# Patient Record
Sex: MALE | Race: BLACK OR AFRICAN AMERICAN | NOT HISPANIC OR LATINO | Employment: FULL TIME | URBAN - METROPOLITAN AREA
[De-identification: names, ages, dates, MRNs, and addresses within clinical notes are randomized per-mention and may not be internally consistent; named-entity substitution may affect disease eponyms.]

---

## 2024-01-30 ENCOUNTER — APPOINTMENT (OUTPATIENT)
Dept: CARDIOLOGY | Facility: HOSPITAL | Age: 44
End: 2024-01-30
Payer: MEDICAID

## 2024-01-30 ENCOUNTER — HOSPITAL ENCOUNTER (INPATIENT)
Facility: HOSPITAL | Age: 44
LOS: 7 days | Discharge: HOME | End: 2024-02-06
Attending: EMERGENCY MEDICINE | Admitting: INTERNAL MEDICINE
Payer: MEDICAID

## 2024-01-30 ENCOUNTER — CLINICAL SUPPORT (OUTPATIENT)
Dept: EMERGENCY MEDICINE | Facility: HOSPITAL | Age: 44
End: 2024-01-30
Payer: MEDICAID

## 2024-01-30 ENCOUNTER — APPOINTMENT (OUTPATIENT)
Dept: RADIOLOGY | Facility: HOSPITAL | Age: 44
End: 2024-01-30
Payer: MEDICAID

## 2024-01-30 DIAGNOSIS — K92.1 GASTROINTESTINAL HEMORRHAGE WITH MELENA: ICD-10-CM

## 2024-01-30 DIAGNOSIS — I40.9 ACUTE MYOCARDITIS, UNSPECIFIED (HHS-HCC): ICD-10-CM

## 2024-01-30 DIAGNOSIS — K92.2 GASTROINTESTINAL HEMORRHAGE, UNSPECIFIED GASTROINTESTINAL HEMORRHAGE TYPE: ICD-10-CM

## 2024-01-30 DIAGNOSIS — Z13.6 ENCOUNTER FOR SCREENING FOR CARDIOVASCULAR DISORDERS: ICD-10-CM

## 2024-01-30 DIAGNOSIS — R60.0 LOCALIZED EDEMA: ICD-10-CM

## 2024-01-30 DIAGNOSIS — J11.1 FLU: ICD-10-CM

## 2024-01-30 DIAGNOSIS — I50.20 HFREF (HEART FAILURE WITH REDUCED EJECTION FRACTION) (MULTI): ICD-10-CM

## 2024-01-30 DIAGNOSIS — G47.00 ACUTE INSOMNIA: ICD-10-CM

## 2024-01-30 DIAGNOSIS — I40.0 ACUTE VIRAL MYOCARDITIS (HHS-HCC): Primary | ICD-10-CM

## 2024-01-30 DIAGNOSIS — R09.89 HEMODYNAMIC INSTABILITY: ICD-10-CM

## 2024-01-30 LAB
ALBUMIN SERPL BCP-MCNC: 4.2 G/DL (ref 3.4–5)
ALBUMIN SERPL BCP-MCNC: 4.5 G/DL (ref 3.4–5)
ALBUMIN SERPL BCP-MCNC: 4.7 G/DL (ref 3.4–5)
ALP SERPL-CCNC: 122 U/L (ref 33–120)
ALP SERPL-CCNC: 96 U/L (ref 33–120)
ALT SERPL W P-5'-P-CCNC: 19 U/L (ref 10–52)
ALT SERPL W P-5'-P-CCNC: 21 U/L (ref 10–52)
ANION GAP BLDV CALCULATED.4IONS-SCNC: 13 MMOL/L (ref 10–25)
ANION GAP BLDV CALCULATED.4IONS-SCNC: 16 MMOL/L (ref 10–25)
ANION GAP BLDV CALCULATED.4IONS-SCNC: 17 MMOL/L (ref 10–25)
ANION GAP BLDV CALCULATED.4IONS-SCNC: 17 MMOL/L (ref 10–25)
ANION GAP SERPL CALC-SCNC: 19 MMOL/L (ref 10–20)
ANION GAP SERPL CALC-SCNC: 23 MMOL/L (ref 10–20)
ANION GAP SERPL CALC-SCNC: 25 MMOL/L (ref 10–20)
AORTIC VALVE PEAK VELOCITY: 1.68 M/S
APTT PPP: 26 SECONDS (ref 27–38)
APTT PPP: 39 SECONDS (ref 27–38)
AST SERPL W P-5'-P-CCNC: 114 U/L (ref 9–39)
AST SERPL W P-5'-P-CCNC: 95 U/L (ref 9–39)
AV PEAK GRADIENT: 11.3 MMHG
AVA (PEAK VEL): 1.91 CM2
BASE EXCESS BLDV CALC-SCNC: -5.6 MMOL/L (ref -2–3)
BASE EXCESS BLDV CALC-SCNC: -6.9 MMOL/L (ref -2–3)
BASE EXCESS BLDV CALC-SCNC: -7.2 MMOL/L (ref -2–3)
BASE EXCESS BLDV CALC-SCNC: -7.8 MMOL/L (ref -2–3)
BASOPHILS # BLD AUTO: 0.05 X10*3/UL (ref 0–0.1)
BASOPHILS # BLD MANUAL: 0.16 X10*3/UL (ref 0–0.1)
BASOPHILS NFR BLD AUTO: 0.3 %
BASOPHILS NFR BLD MANUAL: 0.9 %
BILIRUB SERPL-MCNC: 0.3 MG/DL (ref 0–1.2)
BILIRUB SERPL-MCNC: 0.5 MG/DL (ref 0–1.2)
BNP SERPL-MCNC: 113 PG/ML (ref 0–99)
BNP SERPL-MCNC: 236 PG/ML (ref 0–99)
BODY TEMPERATURE: 37 DEGREES CELSIUS
BUN SERPL-MCNC: 23 MG/DL (ref 6–23)
BUN SERPL-MCNC: 25 MG/DL (ref 6–23)
BUN SERPL-MCNC: 27 MG/DL (ref 6–23)
CA-I BLDV-SCNC: 1.03 MMOL/L (ref 1.1–1.33)
CA-I BLDV-SCNC: 1.04 MMOL/L (ref 1.1–1.33)
CA-I BLDV-SCNC: 1.06 MMOL/L (ref 1.1–1.33)
CA-I BLDV-SCNC: 1.09 MMOL/L (ref 1.1–1.33)
CALCIUM SERPL-MCNC: 10.2 MG/DL (ref 8.6–10.6)
CALCIUM SERPL-MCNC: 8.7 MG/DL (ref 8.6–10.6)
CALCIUM SERPL-MCNC: 9.4 MG/DL (ref 8.6–10.6)
CARDIAC TROPONIN I PNL SERPL HS: 166 NG/L (ref 0–53)
CARDIAC TROPONIN I PNL SERPL HS: 173 NG/L (ref 0–53)
CARDIAC TROPONIN I PNL SERPL HS: 174 NG/L (ref 0–53)
CARDIAC TROPONIN I PNL SERPL HS: 177 NG/L (ref 0–53)
CHLORIDE BLDV-SCNC: 86 MMOL/L (ref 98–107)
CHLORIDE BLDV-SCNC: 88 MMOL/L (ref 98–107)
CHLORIDE BLDV-SCNC: 89 MMOL/L (ref 98–107)
CHLORIDE BLDV-SCNC: 92 MMOL/L (ref 98–107)
CHLORIDE SERPL-SCNC: 85 MMOL/L (ref 98–107)
CHLORIDE SERPL-SCNC: 87 MMOL/L (ref 98–107)
CHLORIDE SERPL-SCNC: 90 MMOL/L (ref 98–107)
CHOLEST SERPL-MCNC: 125 MG/DL (ref 0–199)
CHOLESTEROL/HDL RATIO: 2.5
CO2 SERPL-SCNC: 15 MMOL/L (ref 21–32)
CO2 SERPL-SCNC: 16 MMOL/L (ref 21–32)
CO2 SERPL-SCNC: 18 MMOL/L (ref 21–32)
CREAT SERPL-MCNC: 2.11 MG/DL (ref 0.5–1.3)
CREAT SERPL-MCNC: 2.19 MG/DL (ref 0.5–1.3)
CREAT SERPL-MCNC: 3.09 MG/DL (ref 0.5–1.3)
EGFRCR SERPLBLD CKD-EPI 2021: 25 ML/MIN/1.73M*2
EGFRCR SERPLBLD CKD-EPI 2021: 37 ML/MIN/1.73M*2
EGFRCR SERPLBLD CKD-EPI 2021: 39 ML/MIN/1.73M*2
EJECTION FRACTION APICAL 4 CHAMBER: 55
EJECTION FRACTION: 43 %
EOSINOPHIL # BLD AUTO: 0 X10*3/UL (ref 0–0.7)
EOSINOPHIL # BLD MANUAL: 0 X10*3/UL (ref 0–0.7)
EOSINOPHIL NFR BLD AUTO: 0 %
EOSINOPHIL NFR BLD MANUAL: 0 %
ERYTHROCYTE [DISTWIDTH] IN BLOOD BY AUTOMATED COUNT: 13.5 % (ref 11.5–14.5)
ERYTHROCYTE [DISTWIDTH] IN BLOOD BY AUTOMATED COUNT: 13.8 % (ref 11.5–14.5)
FLUAV RNA RESP QL NAA+PROBE: DETECTED
FLUBV RNA RESP QL NAA+PROBE: NOT DETECTED
GLUCOSE BLD MANUAL STRIP-MCNC: 114 MG/DL (ref 74–99)
GLUCOSE BLDV-MCNC: 113 MG/DL (ref 74–99)
GLUCOSE BLDV-MCNC: 115 MG/DL (ref 74–99)
GLUCOSE BLDV-MCNC: 117 MG/DL (ref 74–99)
GLUCOSE BLDV-MCNC: 158 MG/DL (ref 74–99)
GLUCOSE SERPL-MCNC: 139 MG/DL (ref 74–99)
GLUCOSE SERPL-MCNC: 93 MG/DL (ref 74–99)
GLUCOSE SERPL-MCNC: 98 MG/DL (ref 74–99)
HCO3 BLDV-SCNC: 15.4 MMOL/L (ref 22–26)
HCO3 BLDV-SCNC: 19.2 MMOL/L (ref 22–26)
HCO3 BLDV-SCNC: 19.4 MMOL/L (ref 22–26)
HCO3 BLDV-SCNC: 20.2 MMOL/L (ref 22–26)
HCT VFR BLD AUTO: 43.8 % (ref 41–52)
HCT VFR BLD AUTO: 48.7 % (ref 41–52)
HCT VFR BLD EST: 47 % (ref 41–52)
HCT VFR BLD EST: 50 % (ref 41–52)
HCT VFR BLD EST: 52 % (ref 41–52)
HCT VFR BLD EST: 55 % (ref 41–52)
HDLC SERPL-MCNC: 50.8 MG/DL
HGB BLD-MCNC: 16.2 G/DL (ref 13.5–17.5)
HGB BLD-MCNC: 18 G/DL (ref 13.5–17.5)
HGB BLDV-MCNC: 15.7 G/DL (ref 13.5–17.5)
HGB BLDV-MCNC: 16.8 G/DL (ref 13.5–17.5)
HGB BLDV-MCNC: 17.2 G/DL (ref 13.5–17.5)
HGB BLDV-MCNC: 18.2 G/DL (ref 13.5–17.5)
IMM GRANULOCYTES # BLD AUTO: 0.1 X10*3/UL (ref 0–0.7)
IMM GRANULOCYTES # BLD AUTO: 0.1 X10*3/UL (ref 0–0.7)
IMM GRANULOCYTES NFR BLD AUTO: 0.6 % (ref 0–0.9)
IMM GRANULOCYTES NFR BLD AUTO: 0.6 % (ref 0–0.9)
INHALED O2 CONCENTRATION: 32 %
INHALED O2 CONCENTRATION: 40 %
INR PPP: 1.1 (ref 0.9–1.1)
INR PPP: 1.2 (ref 0.9–1.1)
LACTATE BLDV-SCNC: 1.8 MMOL/L (ref 0.4–2)
LACTATE BLDV-SCNC: 1.9 MMOL/L (ref 0.4–2)
LACTATE BLDV-SCNC: 3.6 MMOL/L (ref 0.4–2)
LACTATE BLDV-SCNC: 5.3 MMOL/L (ref 0.4–2)
LDLC SERPL CALC-MCNC: 54 MG/DL
LEFT ATRIUM VOLUME AREA LENGTH INDEX BSA: 18.4 ML/M2
LEFT VENTRICLE INTERNAL DIMENSION DIASTOLE: 5.47 CM (ref 3.5–6)
LEFT VENTRICULAR OUTFLOW TRACT DIAMETER: 2 CM
LYMPHOCYTES # BLD AUTO: 1.34 X10*3/UL (ref 1.2–4.8)
LYMPHOCYTES # BLD MANUAL: 1.58 X10*3/UL (ref 1.2–4.8)
LYMPHOCYTES NFR BLD AUTO: 7.8 %
LYMPHOCYTES NFR BLD MANUAL: 8.8 %
MAGNESIUM SERPL-MCNC: 1.52 MG/DL (ref 1.6–2.4)
MCH RBC QN AUTO: 30.3 PG (ref 26–34)
MCH RBC QN AUTO: 30.7 PG (ref 26–34)
MCHC RBC AUTO-ENTMCNC: 37 G/DL (ref 32–36)
MCHC RBC AUTO-ENTMCNC: 37 G/DL (ref 32–36)
MCV RBC AUTO: 82 FL (ref 80–100)
MCV RBC AUTO: 83 FL (ref 80–100)
METAMYELOCYTES # BLD MANUAL: 0.16 X10*3/UL
METAMYELOCYTES NFR BLD MANUAL: 0.9 %
MONOCYTES # BLD AUTO: 0.37 X10*3/UL (ref 0.1–1)
MONOCYTES # BLD MANUAL: 0.16 X10*3/UL (ref 0.1–1)
MONOCYTES NFR BLD AUTO: 2.2 %
MONOCYTES NFR BLD MANUAL: 0.9 %
NEUTROPHILS # BLD AUTO: 15.31 X10*3/UL (ref 1.2–7.7)
NEUTROPHILS # BLD MANUAL: 14.75 X10*3/UL (ref 1.2–7.7)
NEUTROPHILS NFR BLD AUTO: 89.1 %
NEUTS BAND # BLD MANUAL: 6.28 X10*3/UL (ref 0–0.7)
NEUTS BAND NFR BLD MANUAL: 35.1 %
NEUTS SEG # BLD MANUAL: 8.47 X10*3/UL (ref 1.2–7)
NEUTS SEG NFR BLD MANUAL: 47.3 %
NON HDL CHOLESTEROL: 74 MG/DL (ref 0–149)
NRBC BLD-RTO: 0.3 /100 WBCS (ref 0–0)
NRBC BLD-RTO: 0.4 /100 WBCS (ref 0–0)
OXYHGB MFR BLDV: 22 % (ref 45–75)
OXYHGB MFR BLDV: 24.5 % (ref 45–75)
OXYHGB MFR BLDV: 8.3 % (ref 45–75)
OXYHGB MFR BLDV: 82.6 % (ref 45–75)
PCO2 BLDV: 26 MM HG (ref 41–51)
PCO2 BLDV: 36 MM HG (ref 41–51)
PCO2 BLDV: 40 MM HG (ref 41–51)
PCO2 BLDV: 46 MM HG (ref 41–51)
PH BLDV: 7.25 PH (ref 7.33–7.43)
PH BLDV: 7.29 PH (ref 7.33–7.43)
PH BLDV: 7.34 PH (ref 7.33–7.43)
PH BLDV: 7.38 PH (ref 7.33–7.43)
PHOSPHATE SERPL-MCNC: 5.5 MG/DL (ref 2.5–4.9)
PHOSPHATE SERPL-MCNC: 6.1 MG/DL (ref 2.5–4.9)
PLATELET # BLD AUTO: 180 X10*3/UL (ref 150–450)
PLATELET # BLD AUTO: 195 X10*3/UL (ref 150–450)
PO2 BLDV: 17 MM HG (ref 35–45)
PO2 BLDV: 23 MM HG (ref 35–45)
PO2 BLDV: 28 MM HG (ref 35–45)
PO2 BLDV: 59 MM HG (ref 35–45)
POTASSIUM BLDV-SCNC: 2.9 MMOL/L (ref 3.5–5.3)
POTASSIUM BLDV-SCNC: 2.9 MMOL/L (ref 3.5–5.3)
POTASSIUM BLDV-SCNC: 3 MMOL/L (ref 3.5–5.3)
POTASSIUM BLDV-SCNC: 3.3 MMOL/L (ref 3.5–5.3)
POTASSIUM SERPL-SCNC: 2.9 MMOL/L (ref 3.5–5.3)
POTASSIUM SERPL-SCNC: 3.1 MMOL/L (ref 3.5–5.3)
POTASSIUM SERPL-SCNC: 3.4 MMOL/L (ref 3.5–5.3)
PROT SERPL-MCNC: 11.1 G/DL (ref 6.4–8.2)
PROT SERPL-MCNC: 9.1 G/DL (ref 6.4–8.2)
PROTHROMBIN TIME: 12.3 SECONDS (ref 9.8–12.8)
PROTHROMBIN TIME: 13.5 SECONDS (ref 9.8–12.8)
RBC # BLD AUTO: 5.35 X10*6/UL (ref 4.5–5.9)
RBC # BLD AUTO: 5.87 X10*6/UL (ref 4.5–5.9)
RBC MORPH BLD: ABNORMAL
RIGHT VENTRICLE FREE WALL PEAK S': 7.72 CM/S
SAO2 % BLDV: 22 % (ref 45–75)
SAO2 % BLDV: 25 % (ref 45–75)
SAO2 % BLDV: 8 % (ref 45–75)
SAO2 % BLDV: 84 % (ref 45–75)
SARS-COV-2 RNA RESP QL NAA+PROBE: NOT DETECTED
SODIUM BLDV-SCNC: 118 MMOL/L (ref 136–145)
SODIUM BLDV-SCNC: 118 MMOL/L (ref 136–145)
SODIUM BLDV-SCNC: 121 MMOL/L (ref 136–145)
SODIUM BLDV-SCNC: 122 MMOL/L (ref 136–145)
SODIUM SERPL-SCNC: 122 MMOL/L (ref 136–145)
SODIUM SERPL-SCNC: 123 MMOL/L (ref 136–145)
SODIUM SERPL-SCNC: 124 MMOL/L (ref 136–145)
TOTAL CELLS COUNTED BLD: 114
TRICUSPID ANNULAR PLANE SYSTOLIC EXCURSION: 0.8 CM
TRIGL SERPL-MCNC: 102 MG/DL (ref 0–149)
VARIANT LYMPHS # BLD MANUAL: 1.09 X10*3/UL (ref 0–0.5)
VARIANT LYMPHS NFR BLD: 6.1 %
VLDL: 20 MG/DL (ref 0–40)
WBC # BLD AUTO: 17.2 X10*3/UL (ref 4.4–11.3)
WBC # BLD AUTO: 17.9 X10*3/UL (ref 4.4–11.3)

## 2024-01-30 PROCEDURE — 83880 ASSAY OF NATRIURETIC PEPTIDE: CPT

## 2024-01-30 PROCEDURE — 80053 COMPREHEN METABOLIC PANEL: CPT

## 2024-01-30 PROCEDURE — 93308 TTE F-UP OR LMTD: CPT

## 2024-01-30 PROCEDURE — 96367 TX/PROPH/DG ADDL SEQ IV INF: CPT

## 2024-01-30 PROCEDURE — 2500000004 HC RX 250 GENERAL PHARMACY W/ HCPCS (ALT 636 FOR OP/ED): Performed by: EMERGENCY MEDICINE

## 2024-01-30 PROCEDURE — 71275 CT ANGIOGRAPHY CHEST: CPT | Mod: FOREIGN READ | Performed by: RADIOLOGY

## 2024-01-30 PROCEDURE — 85060 BLOOD SMEAR INTERPRETATION: CPT

## 2024-01-30 PROCEDURE — 84443 ASSAY THYROID STIM HORMONE: CPT | Performed by: STUDENT IN AN ORGANIZED HEALTH CARE EDUCATION/TRAINING PROGRAM

## 2024-01-30 PROCEDURE — 94640 AIRWAY INHALATION TREATMENT: CPT

## 2024-01-30 PROCEDURE — 87636 SARSCOV2 & INF A&B AMP PRB: CPT

## 2024-01-30 PROCEDURE — 84484 ASSAY OF TROPONIN QUANT: CPT

## 2024-01-30 PROCEDURE — 99291 CRITICAL CARE FIRST HOUR: CPT | Performed by: INTERNAL MEDICINE

## 2024-01-30 PROCEDURE — 85007 BL SMEAR W/DIFF WBC COUNT: CPT

## 2024-01-30 PROCEDURE — 99291 CRITICAL CARE FIRST HOUR: CPT | Mod: 25 | Performed by: EMERGENCY MEDICINE

## 2024-01-30 PROCEDURE — 2500000004 HC RX 250 GENERAL PHARMACY W/ HCPCS (ALT 636 FOR OP/ED): Mod: SE | Performed by: STUDENT IN AN ORGANIZED HEALTH CARE EDUCATION/TRAINING PROGRAM

## 2024-01-30 PROCEDURE — 2500000004 HC RX 250 GENERAL PHARMACY W/ HCPCS (ALT 636 FOR OP/ED)

## 2024-01-30 PROCEDURE — 80061 LIPID PANEL: CPT | Performed by: STUDENT IN AN ORGANIZED HEALTH CARE EDUCATION/TRAINING PROGRAM

## 2024-01-30 PROCEDURE — 82947 ASSAY GLUCOSE BLOOD QUANT: CPT

## 2024-01-30 PROCEDURE — 71275 CT ANGIOGRAPHY CHEST: CPT | Mod: FR

## 2024-01-30 PROCEDURE — 93306 TTE W/DOPPLER COMPLETE: CPT

## 2024-01-30 PROCEDURE — 99285 EMERGENCY DEPT VISIT HI MDM: CPT | Performed by: EMERGENCY MEDICINE

## 2024-01-30 PROCEDURE — 84132 ASSAY OF SERUM POTASSIUM: CPT | Performed by: STUDENT IN AN ORGANIZED HEALTH CARE EDUCATION/TRAINING PROGRAM

## 2024-01-30 PROCEDURE — 36415 COLL VENOUS BLD VENIPUNCTURE: CPT | Performed by: STUDENT IN AN ORGANIZED HEALTH CARE EDUCATION/TRAINING PROGRAM

## 2024-01-30 PROCEDURE — 84132 ASSAY OF SERUM POTASSIUM: CPT

## 2024-01-30 PROCEDURE — 87040 BLOOD CULTURE FOR BACTERIA: CPT

## 2024-01-30 PROCEDURE — 93308 TTE F-UP OR LMTD: CPT | Performed by: EMERGENCY MEDICINE

## 2024-01-30 PROCEDURE — 85025 COMPLETE CBC W/AUTO DIFF WBC: CPT | Performed by: STUDENT IN AN ORGANIZED HEALTH CARE EDUCATION/TRAINING PROGRAM

## 2024-01-30 PROCEDURE — 93010 ELECTROCARDIOGRAM REPORT: CPT | Performed by: STUDENT IN AN ORGANIZED HEALTH CARE EDUCATION/TRAINING PROGRAM

## 2024-01-30 PROCEDURE — 2500000002 HC RX 250 W HCPCS SELF ADMINISTERED DRUGS (ALT 637 FOR MEDICARE OP, ALT 636 FOR OP/ED): Mod: SE

## 2024-01-30 PROCEDURE — 96365 THER/PROPH/DIAG IV INF INIT: CPT | Mod: 59

## 2024-01-30 PROCEDURE — 93005 ELECTROCARDIOGRAM TRACING: CPT

## 2024-01-30 PROCEDURE — 83880 ASSAY OF NATRIURETIC PEPTIDE: CPT | Performed by: STUDENT IN AN ORGANIZED HEALTH CARE EDUCATION/TRAINING PROGRAM

## 2024-01-30 PROCEDURE — 71045 X-RAY EXAM CHEST 1 VIEW: CPT | Performed by: RADIOLOGY

## 2024-01-30 PROCEDURE — 36415 COLL VENOUS BLD VENIPUNCTURE: CPT

## 2024-01-30 PROCEDURE — 2020000001 HC ICU ROOM DAILY

## 2024-01-30 PROCEDURE — 2500000004 HC RX 250 GENERAL PHARMACY W/ HCPCS (ALT 636 FOR OP/ED): Mod: SE

## 2024-01-30 PROCEDURE — 93010 ELECTROCARDIOGRAM REPORT: CPT | Performed by: EMERGENCY MEDICINE

## 2024-01-30 PROCEDURE — 93306 TTE W/DOPPLER COMPLETE: CPT | Performed by: INTERNAL MEDICINE

## 2024-01-30 PROCEDURE — 2500000004 HC RX 250 GENERAL PHARMACY W/ HCPCS (ALT 636 FOR OP/ED): Performed by: STUDENT IN AN ORGANIZED HEALTH CARE EDUCATION/TRAINING PROGRAM

## 2024-01-30 PROCEDURE — 84484 ASSAY OF TROPONIN QUANT: CPT | Performed by: STUDENT IN AN ORGANIZED HEALTH CARE EDUCATION/TRAINING PROGRAM

## 2024-01-30 PROCEDURE — 85610 PROTHROMBIN TIME: CPT | Performed by: STUDENT IN AN ORGANIZED HEALTH CARE EDUCATION/TRAINING PROGRAM

## 2024-01-30 PROCEDURE — 85027 COMPLETE CBC AUTOMATED: CPT

## 2024-01-30 PROCEDURE — 2550000001 HC RX 255 CONTRASTS: Mod: SE | Performed by: EMERGENCY MEDICINE

## 2024-01-30 PROCEDURE — 96361 HYDRATE IV INFUSION ADD-ON: CPT

## 2024-01-30 PROCEDURE — 71045 X-RAY EXAM CHEST 1 VIEW: CPT

## 2024-01-30 PROCEDURE — 83735 ASSAY OF MAGNESIUM: CPT | Performed by: STUDENT IN AN ORGANIZED HEALTH CARE EDUCATION/TRAINING PROGRAM

## 2024-01-30 PROCEDURE — 85610 PROTHROMBIN TIME: CPT

## 2024-01-30 PROCEDURE — 99291 CRITICAL CARE FIRST HOUR: CPT | Performed by: EMERGENCY MEDICINE

## 2024-01-30 PROCEDURE — 84145 PROCALCITONIN (PCT): CPT | Performed by: STUDENT IN AN ORGANIZED HEALTH CARE EDUCATION/TRAINING PROGRAM

## 2024-01-30 PROCEDURE — 2500000001 HC RX 250 WO HCPCS SELF ADMINISTERED DRUGS (ALT 637 FOR MEDICARE OP): Performed by: STUDENT IN AN ORGANIZED HEALTH CARE EDUCATION/TRAINING PROGRAM

## 2024-01-30 RX ORDER — POTASSIUM CHLORIDE 14.9 MG/ML
20 INJECTION INTRAVENOUS ONCE
Status: COMPLETED | OUTPATIENT
Start: 2024-01-30 | End: 2024-01-31

## 2024-01-30 RX ORDER — CEFTRIAXONE 1 G/50ML
1 INJECTION, SOLUTION INTRAVENOUS EVERY 24 HOURS
Status: DISCONTINUED | OUTPATIENT
Start: 2024-01-31 | End: 2024-02-02

## 2024-01-30 RX ORDER — HYDROMORPHONE HYDROCHLORIDE 1 MG/ML
0.2 INJECTION, SOLUTION INTRAMUSCULAR; INTRAVENOUS; SUBCUTANEOUS
Status: DISCONTINUED | OUTPATIENT
Start: 2024-01-30 | End: 2024-02-06 | Stop reason: HOSPADM

## 2024-01-30 RX ORDER — OSELTAMIVIR PHOSPHATE 75 MG/1
75 CAPSULE ORAL 2 TIMES DAILY
Status: CANCELLED | OUTPATIENT
Start: 2024-01-30 | End: 2024-02-04

## 2024-01-30 RX ORDER — CEFTRIAXONE 1 G/50ML
1 INJECTION, SOLUTION INTRAVENOUS ONCE
Status: COMPLETED | OUTPATIENT
Start: 2024-01-30 | End: 2024-01-30

## 2024-01-30 RX ORDER — ALBUTEROL SULFATE 90 UG/1
2 AEROSOL, METERED RESPIRATORY (INHALATION) EVERY 2 HOUR PRN
Status: CANCELLED | OUTPATIENT
Start: 2024-01-30

## 2024-01-30 RX ORDER — POTASSIUM CHLORIDE 1.5 G/1.58G
20 POWDER, FOR SOLUTION ORAL ONCE
Status: DISCONTINUED | OUTPATIENT
Start: 2024-01-30 | End: 2024-01-30

## 2024-01-30 RX ORDER — HEPARIN SODIUM 5000 [USP'U]/ML
5000 INJECTION, SOLUTION INTRAVENOUS; SUBCUTANEOUS EVERY 8 HOURS
Status: DISCONTINUED | OUTPATIENT
Start: 2024-01-30 | End: 2024-02-03

## 2024-01-30 RX ORDER — ALBUTEROL SULFATE 90 UG/1
2 AEROSOL, METERED RESPIRATORY (INHALATION) EVERY 6 HOURS PRN
Status: DISCONTINUED | OUTPATIENT
Start: 2024-01-30 | End: 2024-02-06 | Stop reason: HOSPADM

## 2024-01-30 RX ORDER — ACETAMINOPHEN 500 MG
5 TABLET ORAL NIGHTLY
Status: DISCONTINUED | OUTPATIENT
Start: 2024-01-30 | End: 2024-02-06 | Stop reason: HOSPADM

## 2024-01-30 RX ORDER — CEFTRIAXONE 1 G/50ML
1 INJECTION, SOLUTION INTRAVENOUS EVERY 24 HOURS
Status: CANCELLED | OUTPATIENT
Start: 2024-01-30 | End: 2024-02-04

## 2024-01-30 RX ORDER — OSELTAMIVIR PHOSPHATE 75 MG/1
75 CAPSULE ORAL 2 TIMES DAILY
Status: DISCONTINUED | OUTPATIENT
Start: 2024-01-30 | End: 2024-02-05

## 2024-01-30 RX ORDER — ACETAMINOPHEN 325 MG/1
650 TABLET ORAL EVERY 4 HOURS PRN
Status: DISCONTINUED | OUTPATIENT
Start: 2024-01-30 | End: 2024-02-06 | Stop reason: HOSPADM

## 2024-01-30 RX ORDER — IPRATROPIUM BROMIDE AND ALBUTEROL SULFATE 2.5; .5 MG/3ML; MG/3ML
3 SOLUTION RESPIRATORY (INHALATION)
Status: DISCONTINUED | OUTPATIENT
Start: 2024-01-30 | End: 2024-01-31

## 2024-01-30 RX ORDER — ACETAMINOPHEN 325 MG/1
975 TABLET ORAL ONCE
Status: COMPLETED | OUTPATIENT
Start: 2024-01-30 | End: 2024-01-30

## 2024-01-30 RX ORDER — POTASSIUM CHLORIDE 20 MEQ/1
20 TABLET, EXTENDED RELEASE ORAL ONCE
Status: COMPLETED | OUTPATIENT
Start: 2024-01-30 | End: 2024-01-30

## 2024-01-30 RX ORDER — POTASSIUM CHLORIDE 14.9 MG/ML
20 INJECTION INTRAVENOUS
Status: CANCELLED | OUTPATIENT
Start: 2024-01-30 | End: 2024-01-30

## 2024-01-30 RX ADMIN — IPRATROPIUM BROMIDE AND ALBUTEROL SULFATE 3 ML: .5; 3 SOLUTION RESPIRATORY (INHALATION) at 10:14

## 2024-01-30 RX ADMIN — IPRATROPIUM BROMIDE AND ALBUTEROL SULFATE 3 ML: .5; 3 SOLUTION RESPIRATORY (INHALATION) at 18:50

## 2024-01-30 RX ADMIN — SODIUM CHLORIDE 1000 ML: 9 INJECTION, SOLUTION INTRAVENOUS at 10:13

## 2024-01-30 RX ADMIN — SODIUM CHLORIDE 500 ML: 9 INJECTION, SOLUTION INTRAVENOUS at 13:15

## 2024-01-30 RX ADMIN — CEFTRIAXONE 1 G: 1 INJECTION, SOLUTION INTRAVENOUS at 13:15

## 2024-01-30 RX ADMIN — HEPARIN SODIUM 5000 UNITS: 5000 INJECTION INTRAVENOUS; SUBCUTANEOUS at 22:01

## 2024-01-30 RX ADMIN — POTASSIUM CHLORIDE 20 MEQ: 1500 TABLET, EXTENDED RELEASE ORAL at 23:12

## 2024-01-30 RX ADMIN — AZITHROMYCIN 500 MG: 500 INJECTION, POWDER, LYOPHILIZED, FOR SOLUTION INTRAVENOUS at 14:30

## 2024-01-30 RX ADMIN — SODIUM CHLORIDE 500 ML: 9 INJECTION, SOLUTION INTRAVENOUS at 20:26

## 2024-01-30 RX ADMIN — SODIUM CHLORIDE, SODIUM LACTATE, POTASSIUM CHLORIDE, AND CALCIUM CHLORIDE 500 ML: 600; 310; 30; 20 INJECTION, SOLUTION INTRAVENOUS at 15:33

## 2024-01-30 RX ADMIN — IOHEXOL 90 ML: 350 INJECTION, SOLUTION INTRAVENOUS at 11:38

## 2024-01-30 RX ADMIN — POTASSIUM CHLORIDE 20 MEQ: 14.9 INJECTION, SOLUTION INTRAVENOUS at 22:57

## 2024-01-30 RX ADMIN — IPRATROPIUM BROMIDE AND ALBUTEROL SULFATE 3 ML: .5; 3 SOLUTION RESPIRATORY (INHALATION) at 13:00

## 2024-01-30 RX ADMIN — OSELTAMIVIR PHOSPHATE 75 MG: 75 CAPSULE ORAL at 21:15

## 2024-01-30 RX ADMIN — SODIUM CHLORIDE, SODIUM LACTATE, POTASSIUM CHLORIDE, AND CALCIUM CHLORIDE 500 ML: 600; 310; 30; 20 INJECTION, SOLUTION INTRAVENOUS at 21:30

## 2024-01-30 RX ADMIN — ACETAMINOPHEN 975 MG: 325 TABLET ORAL at 09:55

## 2024-01-30 RX ADMIN — PERFLUTREN 2 ML OF DILUTION: 6.52 INJECTION, SUSPENSION INTRAVENOUS at 16:35

## 2024-01-30 RX ADMIN — Medication 5 MG: at 23:12

## 2024-01-30 SDOH — SOCIAL STABILITY: SOCIAL INSECURITY: ARE YOU OR HAVE YOU BEEN THREATENED OR ABUSED PHYSICALLY, EMOTIONALLY, OR SEXUALLY BY ANYONE?: NO

## 2024-01-30 SDOH — SOCIAL STABILITY: SOCIAL INSECURITY: HAS ANYONE EVER THREATENED TO HURT YOUR FAMILY OR YOUR PETS?: NO

## 2024-01-30 SDOH — SOCIAL STABILITY: SOCIAL INSECURITY: DO YOU FEEL ANYONE HAS EXPLOITED OR TAKEN ADVANTAGE OF YOU FINANCIALLY OR OF YOUR PERSONAL PROPERTY?: NO

## 2024-01-30 SDOH — SOCIAL STABILITY: SOCIAL INSECURITY: DO YOU FEEL UNSAFE GOING BACK TO THE PLACE WHERE YOU ARE LIVING?: NO

## 2024-01-30 SDOH — SOCIAL STABILITY: SOCIAL INSECURITY: HAVE YOU HAD THOUGHTS OF HARMING ANYONE ELSE?: NO

## 2024-01-30 SDOH — SOCIAL STABILITY: SOCIAL INSECURITY: ARE THERE ANY APPARENT SIGNS OF INJURIES/BEHAVIORS THAT COULD BE RELATED TO ABUSE/NEGLECT?: NO

## 2024-01-30 SDOH — SOCIAL STABILITY: SOCIAL INSECURITY: ABUSE: ADULT

## 2024-01-30 SDOH — SOCIAL STABILITY: SOCIAL INSECURITY: DOES ANYONE TRY TO KEEP YOU FROM HAVING/CONTACTING OTHER FRIENDS OR DOING THINGS OUTSIDE YOUR HOME?: NO

## 2024-01-30 ASSESSMENT — COLUMBIA-SUICIDE SEVERITY RATING SCALE - C-SSRS
1. IN THE PAST MONTH, HAVE YOU WISHED YOU WERE DEAD OR WISHED YOU COULD GO TO SLEEP AND NOT WAKE UP?: NO
2. HAVE YOU ACTUALLY HAD ANY THOUGHTS OF KILLING YOURSELF?: NO
6. HAVE YOU EVER DONE ANYTHING, STARTED TO DO ANYTHING, OR PREPARED TO DO ANYTHING TO END YOUR LIFE?: NO

## 2024-01-30 ASSESSMENT — ENCOUNTER SYMPTOMS: TACHYCARDIA: 1

## 2024-01-30 ASSESSMENT — ACTIVITIES OF DAILY LIVING (ADL)
DRESSING YOURSELF: INDEPENDENT
BATHING: INDEPENDENT
ADEQUATE_TO_COMPLETE_ADL: YES
JUDGMENT_ADEQUATE_SAFELY_COMPLETE_DAILY_ACTIVITIES: YES
HEARING - RIGHT EAR: FUNCTIONAL
FEEDING YOURSELF: INDEPENDENT
GROOMING: INDEPENDENT
TOILETING: INDEPENDENT
WALKS IN HOME: INDEPENDENT
HEARING - LEFT EAR: FUNCTIONAL
PATIENT'S MEMORY ADEQUATE TO SAFELY COMPLETE DAILY ACTIVITIES?: YES

## 2024-01-30 ASSESSMENT — PAIN SCALES - GENERAL
PAINLEVEL_OUTOF10: 0 - NO PAIN
PAINLEVEL_OUTOF10: 5 - MODERATE PAIN

## 2024-01-30 ASSESSMENT — PAIN - FUNCTIONAL ASSESSMENT
PAIN_FUNCTIONAL_ASSESSMENT: 0-10
PAIN_FUNCTIONAL_ASSESSMENT: 0-10

## 2024-01-30 ASSESSMENT — LIFESTYLE VARIABLES
HOW MANY STANDARD DRINKS CONTAINING ALCOHOL DO YOU HAVE ON A TYPICAL DAY: PATIENT DOES NOT DRINK
AUDIT-C TOTAL SCORE: 0
HOW OFTEN DO YOU HAVE A DRINK CONTAINING ALCOHOL: NEVER
SUBSTANCE_ABUSE_PAST_12_MONTHS: NO
AUDIT-C TOTAL SCORE: 0
HOW OFTEN DO YOU HAVE 6 OR MORE DRINKS ON ONE OCCASION: NEVER
PRESCIPTION_ABUSE_PAST_12_MONTHS: NO
SKIP TO QUESTIONS 9-10: 1

## 2024-01-30 ASSESSMENT — PATIENT HEALTH QUESTIONNAIRE - PHQ9
1. LITTLE INTEREST OR PLEASURE IN DOING THINGS: NOT AT ALL
SUM OF ALL RESPONSES TO PHQ9 QUESTIONS 1 & 2: 0
2. FEELING DOWN, DEPRESSED OR HOPELESS: NOT AT ALL

## 2024-01-30 NOTE — ED PROVIDER NOTES
CC: Flu Symptoms     HPI:  This patient is a 43-year-old male with past medical history of asthma who presents to the emergency department flulike symptoms.  He states his symptoms have been going on for 2 or 3 days now.  His symptoms include fatigue, shortness of breath, chest pain, diarrhea, nausea, and vomiting.  States his symptoms have been getting worse.  He stayed home to try to fight it off for a few days however is unable to take his symptoms anymore.  He is unable to keep anything down and feels short of breath.  Describes left-sided chest pain that is nonradiating.  Denies any abdominal pain.  He does not have an inhaler to use and therefore has not been taking anything for his symptoms at home.  He denies any sick contacts that he is aware of.    Limitations to history: None  Independent historian(s): Patient  Records Reviewed: Recent available ED and inpatient notes reviewed in EMR.    PMHx/PSHx:  Per HPI.   - has no past medical history on file.  - has no past surgical history on file.    Medications:  Reviewed in EMR. See EMR for complete list of medications and doses.    Allergies:  Patient has no known allergies.    Social History:  - Tobacco:  has no history on file for tobacco use.   - Alcohol:  has no history on file for alcohol use.   - Illicit Drugs:  has no history on file for drug use.     ROS:  Per HPI.       ???????????????????????????????????????????????????????????????  Triage Vitals:  T 36.8 °C (98.2 °F)  HR (!) 112  BP 87/58  RR 17  O2 98 %      Physical Exam    General: Male patient lying in bed, visibly rigoring, no respiratory distress, appears acutely sick and toxic, talking in full sentences without any confusion or mental status changes.  Head: Normocephalic.  Small cephalhematoma with dried blood and mild tenderness to palpation to the anterior left forehead.  No active bleeding.  Neck: No meningismus.  FROM. No gross masses.   Eyes: EOMI. No scleral icterus or  injection.  ENT: Dry mucous membranes, no apparent trauma or lesions.  CV: Regular rhythm. No murmurs, rubs, gallops appreciated. 2+ radial pulses bilaterally.  Resp: Clear to auscultation bilaterally. No significant respiratory distress.   GI: Soft, non-distended.  No tenderness with palpation.    EXT: No peripheral edema, contusions, or wounds.  Skin: Warm, mildly diaphoretic, no rashes or lesions.  Neuro: Alert and oriented.  No focal neurological deficits.  Motor and sensation intact throughout.  Speech fluent.  Psych: Appropriate mood and behavior, converses and responds appropriately.    ???????????????????????????????????????????????????????????????  Labs:   Labs Reviewed   SARS-COV-2 AND INFLUENZA A/B PCR - Abnormal       Result Value    Flu A Result Detected (*)     Flu B Result Not Detected      Coronavirus 2019, PCR Not Detected      Narrative:     This assay has received FDA Emergency Use Authorization (EUA) and  is only authorized for the duration of time that circumstances exist to justify the authorization of the emergency use of in vitro diagnostic tests for the detection of SARS-CoV-2 virus and/or diagnosis of COVID-19 infection under section 564(b)(1) of the Act, 21 U.S.C. 360bbb-3(b)(1). Testing for SARS-CoV-2 is only recommended for patients who meet current clinical and/or epidemiological criteria as defined by federal, state, or local public health directives. This assay is an in vitro diagnostic nucleic acid amplification test for the qualitative detection of SARS-CoV-2, Influenza A, and Influenza B from nasopharyngeal specimens and has been validated for use at [facility]. Negative results do not preclude COVID-19 infections or Influenza A/B infections, and should not be used as the sole basis for diagnosis, treatment, or other management decisions. If Influenza A/B and RSV PCR results are negative, testing for Parainfluenza virus, Adenovirus and Metapneumovirus is  routinely performed for [facility] pediatric oncology and intensive care inpatients, and is available on other patients by placing an add-on request.    COAGULATION SCREEN - Abnormal    Protime 13.5 (*)     INR 1.2 (*)     aPTT 39 (*)     Narrative:     The APTT is no longer used for monitoring Unfractionated Heparin Therapy. For monitoring Heparin Therapy, use the Heparin Assay.   TROPONIN I, HIGH SENSITIVITY - Abnormal    Troponin I, High Sensitivity 173 (*)     Narrative:     Less than 99th percentile of normal range cutoff-  Female and children under 18 years old <35 ng/L; Male <54 ng/L: Negative  Repeat testing should be performed if clinically indicated.     Female and children under 18 years old  ng/L; Male  ng/L:  Consistent with possible cardiac damage and possible increased clinical   risk. Serial measurements may help to assess extent of myocardial damage.     >120 ng/L: Consistent with cardiac damage, increased clinical risk and  myocardial infarction. Serial measurements may help assess extent of   myocardial damage.      NOTE: Children less than 1 year old may have higher baseline troponin   levels and results should be interpreted in conjunction with the overall   clinical context.    NOTE: Troponin I testing is performed using a different   testing methodology at [facility] than at other   [facility]. Direct result comparisons should only   be made within the same method.     CBC WITH AUTO DIFFERENTIAL - Abnormal    WBC 17.9 (*)     nRBC 0.4 (*)     RBC 5.87      Hemoglobin 18.0 (*)     Hematocrit 48.7      MCV 83      MCH 30.7      MCHC 37.0 (*)     RDW 13.8      Platelets 195      Neutrophils %        Immature Granulocytes %, Automated        Lymphocytes %        Monocytes %        Eosinophils %        Basophils %        Neutrophils Absolute        Lymphocytes Absolute        Monocytes Absolute        Eosinophils Absolute        Basophils Absolute       COMPREHENSIVE  METABOLIC PANEL - Abnormal    Glucose 139 (*)     Sodium 122 (*)     Potassium 3.4 (*)     Chloride 85 (*)     Bicarbonate 15 (*)     Anion Gap 25 (*)     Urea Nitrogen 23      Creatinine 3.09 (*)     eGFR 25 (*)     Calcium 10.2      Albumin 4.7      Alkaline Phosphatase 122 (*)     Total Protein 11.1 (*)     AST 95 (*)     Bilirubin, Total 0.5      ALT 19     B-TYPE NATRIURETIC PEPTIDE - Abnormal     (*)     Narrative:        <100 pg/mL - Heart failure unlikely  100-299 pg/mL - Intermediate probability of acute heart                  failure exacerbation. Correlate with clinical                  context and patient history.    >=300 pg/mL - Heart Failure likely. Correlate with clinical                  context and patient history.     Biotin interference may cause falsely decreased results. Patients taking a Biotin dose of up to 5 mg/day should refrain from taking Biotin for 24 hours before sample  collection. Providers may contact their local laboratory for further information.   BLOOD GAS VENOUS FULL PANEL UNSOLICITED - Abnormal    POCT pH, Venous 7.29 (*)     POCT pCO2, Venous 40 (*)     POCT pO2, Venous 17 (*)     POCT SO2, Venous 8 (*)     POCT Oxy Hemoglobin, Venous 8.3 (*)     POCT Hematocrit Calculated, Venous 55.0 (*)     POCT Sodium, Venous 118 (*)     POCT Potassium, Venous 3.3 (*)     POCT Chloride, Venous 86 (*)     POCT Ionized Calicum, Venous 1.04 (*)     POCT Glucose, Venous 158 (*)     POCT Lactate, Venous 5.3 (*)     POCT Base Excess, Venous -6.9 (*)     POCT HCO3 Calculated, Venous 19.2 (*)     POCT Hemoglobin, Venous 18.2 (*)     POCT Anion Gap, Venous 16.0      Patient Temperature 37.0     BLOOD GAS LACTIC ACID, VENOUS   BLOOD GAS ARTERIAL FULL PANEL   TROPONIN SERIES- (INITIAL, 1 HR)    Narrative:     The following orders were created for panel order Troponin Series, (0, 1 HR).  Procedure                               Abnormality         Status                     ---------                                -----------         ------                     Troponin I, High Sensitiv...[[id number]]                                                 Troponin, High Sensitivit...[[id number]]                                                   Please view results for these tests on the individual orders.   SERIAL TROPONIN-INITIAL   SERIAL TROPONIN, 1 HOUR   PATH REVIEW-CBC DIFFERENTIAL        Imaging:   CT angio chest for pulmonary embolism   Final Result   No evidence of acute pulmonary embolism. Extensive diffuse bilateral   pneumonia sparing the left upper lobe. Extensive bronchiectasis.   Reactive mediastinal and hilar lymphadenopathy.   Signed by Rambo Carrasco MD      XR chest 2 views    (Results Pending)   Point of Care Ultrasound    (Results Pending)        EK: 56: Rate of 133 bpm, appears regular rhythm, mild right axis, VA interval unable to be calculated, QTc 562, no evidence of ST segment elevations or depressions, appears to be right bundle branch block.  Appears to be sinus tachycardia.    MDM:  This patient is a 43-year-old male who presents emergency department with flulike symptoms.  He is hemodynamically stable and tachycardic on arrival.  Blood pressure is normal, heart rate of 120, respiratory rate of 22.  He is afebrile.  On physical exam he is visibly rigoring.  Concern at this time is for viral illness.  Given his chest pain and shortness of breath also considered cardiac causes.  Pneumonia also considered for other bacterial infection.  A VBG was obtained that showed a sodium of 118 and a lactate of 5.3 given his clinical appearance and this findings he is moved to a monitored room.  EKG obtained and appears to be sinus tachycardia.  A point-of-care ultrasound was obtained showing decreased ejection fraction and cardiac function.  Labs are obtained and the patient has a significant white count of 17.9.  Hemoglobin is 18.  Metabolic panel shows a sodium of 122, potassium of 3.4, chloride of  85, bicarb of 15.  Renal function is decreased with a creatinine of 3.09.  I am concerned all of these findings relate to dehydration and the patient given his current infection.  Influenza A is positive.  Troponin is elevated at 173 and BNP is up at 236.  At this time we are concerned for myocarditis or pericarditis secondary to influenza A.  CT PE ordered and obtained and is negative for blood clots.  Patient is treated with fluids as well as Tylenol at this time.  Given my concern for myocarditis, the patient is discussed with the CICU.  CICU attending recommends careful hydration and will send cardiology fellow to evaluate the patient however recommends medical ICU admission.  Cardiology fellow evaluated the patient at bedside and recommends a formal echo to further assess cardiac function.  This is ordered.  Patient is given additional 500 cc bolus of fluids.  Blood pressures continue to be in the low 100s systolic.  Heart rate continues to be 115-125.  Patient was discussed with the medical ICU who did accept the patient for admission.  She recommended starting Tamiflu despite length of symptoms given possible benefit with low risks.  At this time the patient will be handed off to the oncoming emergency department provider pending results of formal echo and transfer to medical ICU.  May be appropriate to consider de-escalating from intensive care to regular nursing floor with a normal cardiac function on echo and improved hemodynamic stability.    ED Course:  ED Course as of 01/30/24 1652 Tue Jan 30, 2024   1033 POCT Sodium, Venous(!!): 118 [VM]   1033 POCT Lactate, Venous(!!): 5.3 [VM]      ED Course User Index  [VM] Baldomero Pompa DO         Diagnoses as of 01/30/24 1652   Flu   Hemodynamic instability       Social Determinants Limiting Care:  None identified    Disposition:  BRITTANY Pompa, DO   Emergency Medicine PGY-2  [facility]      Procedures ?  IActives last updated 1/30/2024 12:08 PM     Attestation  Patient evaluated with evie resident. Quincy Lee is a 43 y.o. year old male presenting with flulike symptoms.  No medical history.  Noted tachycardic naproxen upon arrival.  Difficulty obtaining vitals, however we were able to obtain open eventually and did have a normal pressure.  Oxygen saturation 96% on room air.  EKG does demonstrate evidence of sinus tachycardia.  Did read SVT, however disagree with machine read.  Given his vital signs VBG was obtained that demonstrated a lactic acidosis as well as significant hyponatremia.  Patient does state that he has not been able to eat anything for the past 4 days that he has been feeling ill.  Denies any significant chest pain or shortness of breath.  Remainder of laboratory workup was obtained that demonstrated elevated troponin and BNP.  Given this, concern for viral myocarditis and bedside POCUS was performed that demonstrated global hypokinesia.  Again, no EKG changes concerning for ischemic process at this time.  Patient eventually did come back flu a positive.  Additionally does have a considerable SURENDRA associated with his hyponatremia.  Was sent for CT PE which did not demonstrate any evidence of acute thrombus.  VBG was repeated following 1 L bolus of fluids that did demonstrate resolution of his lactate.  Does appear to have slightly collapsible IVC, however this was only approximately 200 cc of fluid and, given his concern for myocarditis but hold on provided with additional fluid boluses.  Did reach out to the CICU who stated that they would evaluate the patient at the bedside, however they recommended reaching out to the MICU for admission of both his myocarditis as well as electrolyte abnormalities.  Do not believe the patient is appropriate for the floor at this time, therefore reach out to the MICU for potential admission to their service of CICU does refuse.    Patient seen and discussed with  attending Dr. Edwar Howard MD  Emergency Medicine PGY-3     Baldomero Pompa DO  Resident  01/30/24 0463

## 2024-01-30 NOTE — ED PROCEDURE NOTE
Procedure    Performed by: Baldomero Pompa DO  Authorized by: Anand Vann DO  Cardiac Indications: chest pain and tachycardia  Consitutional Indications: dehydration              Procedure: Cardiac Ultrasound    Findings:   Views: parasternal long and subxiphoid  The pericardial space was visualized and was NEGATIVE for a significant pericardial effusion.  Activity: Ventricular contractions were visualized.  LV: LV systolic function was DECREASED.  RV: RV size was NORMAL.    Impression:  Cardiac: The focused cardiac ultrasound exam had ABNORMAL findings as specified.                   Baldomero Pompa DO  Resident  01/30/24 1702

## 2024-01-30 NOTE — PROGRESS NOTES
Patient has been identified as having an emergent need for administration of iodinated contrast for CT scan prior to result of laboratory studies OR despite known elevated GFR due to possibility of life and/or limb threatening pathology.    I acknowledge the risks and benefits of emergently proceeding with contrast administration including that, at present, it is the position of the [employer] that contrast induced nephropathy (LORA) is a rare but possible consequence. At this time the benefits of proceeding with contrast administration outweigh the risks.    Attempts will be made to mitigate possible LORA risk with IV fluid hydration if able.    Steven Howard MD  Emergency Medicine, PGY-3

## 2024-01-30 NOTE — PROGRESS NOTES
Emergency Medicine Transition of Care Note.    I received Quincy Lee in signout from previous provider. Please see the previous ED provider note for all HPI, PE and MDM up to the time of sign-out. This is in addition to the primary record.    ED Course as of 01/30/24 1652 Tue Jan 30, 2024   1033 POCT Sodium, Venous(!!): 118 [VM]   1033 POCT Lactate, Venous(!!): 5.3 [VM]      ED Course User Index  [VM] Baldomero Pompa,          Diagnoses as of 01/30/24 1652   Flu   Hemodynamic instability     Medical Decision Making    Final diagnoses:   [J11.1] Flu   [R09.89] Hemodynamic instability     At the time of sign out, vital signs were as follows:  Vitals:    01/30/24 1615   BP:    Pulse: (!) 114   Resp: 20   Temp:    SpO2: 96%     In brief Quincy Lee is an 43 y.o. male presenting for flu like symptoms. Patient was signed out to me as having myocarditis waiting on a bed in the MICU.  Patient had been given 1.5 L of fluid prior to my shift. His lab work was notable for a hyponatremia and elevated Creatinine. POCUS showed diminished EF. Flu was positive, started on tamiflu. Patient was continuing to be tachycardic. TTE was performed and showed a diminished EF with global hypokinesia. Lab work showed an improved lactate with a worsening acidosis. Bedside POCUS was performed and showed a collapsible IVC with no signs of pulmonary edema. An additional 500 ml fluid bolus was given. Patient remained hemodynamically stable before transport to the MICU. Repeat troponin and RFP were pending.           Dispo: admit to the MICU    Pt seen and discussed with ED attending    Ilda Turk MD  Emergency Medicine PGY-2

## 2024-01-31 ENCOUNTER — APPOINTMENT (OUTPATIENT)
Dept: RADIOLOGY | Facility: HOSPITAL | Age: 44
End: 2024-01-31
Payer: MEDICAID

## 2024-01-31 LAB
ALBUMIN SERPL BCP-MCNC: 4 G/DL (ref 3.4–5)
ALBUMIN SERPL BCP-MCNC: 4 G/DL (ref 3.4–5)
ALP SERPL-CCNC: 88 U/L (ref 33–120)
ALT SERPL W P-5'-P-CCNC: 22 U/L (ref 10–52)
ANION GAP SERPL CALC-SCNC: 16 MMOL/L (ref 10–20)
ANION GAP SERPL CALC-SCNC: 20 MMOL/L (ref 10–20)
AST SERPL W P-5'-P-CCNC: 120 U/L (ref 9–39)
BASOPHILS # BLD AUTO: 0.12 X10*3/UL (ref 0–0.1)
BASOPHILS NFR BLD AUTO: 0.7 %
BILIRUB SERPL-MCNC: 0.3 MG/DL (ref 0–1.2)
BUN SERPL-MCNC: 28 MG/DL (ref 6–23)
BUN SERPL-MCNC: 28 MG/DL (ref 6–23)
CALCIUM SERPL-MCNC: 8.7 MG/DL (ref 8.6–10.6)
CALCIUM SERPL-MCNC: 8.8 MG/DL (ref 8.6–10.6)
CARDIAC TROPONIN I PNL SERPL HS: 142 NG/L (ref 0–53)
CARDIAC TROPONIN I PNL SERPL HS: 179 NG/L (ref 0–53)
CHLORIDE SERPL-SCNC: 93 MMOL/L (ref 98–107)
CHLORIDE SERPL-SCNC: 94 MMOL/L (ref 98–107)
CHLORIDE UR-SCNC: 22 MMOL/L
CHLORIDE/CREATININE (MMOL/G) IN URINE: 10 MMOL/G CREAT (ref 23–275)
CO2 SERPL-SCNC: 12 MMOL/L (ref 21–32)
CO2 SERPL-SCNC: 15 MMOL/L (ref 21–32)
CREAT SERPL-MCNC: 1.51 MG/DL (ref 0.5–1.3)
CREAT SERPL-MCNC: 1.64 MG/DL (ref 0.5–1.3)
CREAT UR-MCNC: 220.1 MG/DL (ref 20–370)
EGFRCR SERPLBLD CKD-EPI 2021: 53 ML/MIN/1.73M*2
EGFRCR SERPLBLD CKD-EPI 2021: 58 ML/MIN/1.73M*2
EOSINOPHIL # BLD AUTO: 0 X10*3/UL (ref 0–0.7)
EOSINOPHIL NFR BLD AUTO: 0 %
ERYTHROCYTE [DISTWIDTH] IN BLOOD BY AUTOMATED COUNT: 13.4 % (ref 11.5–14.5)
GLUCOSE SERPL-MCNC: 103 MG/DL (ref 74–99)
GLUCOSE SERPL-MCNC: 89 MG/DL (ref 74–99)
HCT VFR BLD AUTO: 42.1 % (ref 41–52)
HGB BLD-MCNC: 15.7 G/DL (ref 13.5–17.5)
IMM GRANULOCYTES # BLD AUTO: 0.12 X10*3/UL (ref 0–0.7)
IMM GRANULOCYTES NFR BLD AUTO: 0.7 % (ref 0–0.9)
LEGIONELLA AG UR QL: NEGATIVE
LYMPHOCYTES # BLD AUTO: 1.52 X10*3/UL (ref 1.2–4.8)
LYMPHOCYTES NFR BLD AUTO: 8.4 %
MAGNESIUM SERPL-MCNC: 2.44 MG/DL (ref 1.6–2.4)
MCH RBC QN AUTO: 30.3 PG (ref 26–34)
MCHC RBC AUTO-ENTMCNC: 37.3 G/DL (ref 32–36)
MCV RBC AUTO: 81 FL (ref 80–100)
MONOCYTES # BLD AUTO: 0.38 X10*3/UL (ref 0.1–1)
MONOCYTES NFR BLD AUTO: 2.1 %
NEUTROPHILS # BLD AUTO: 15.92 X10*3/UL (ref 1.2–7.7)
NEUTROPHILS NFR BLD AUTO: 88.1 %
NRBC BLD-RTO: 0.2 /100 WBCS (ref 0–0)
OSMOLALITY SERPL: 265 MOSM/KG (ref 280–300)
OSMOLALITY UR: 480 MOSM/KG (ref 200–1200)
PATH REVIEW-CBC DIFFERENTIAL: NORMAL
PHOSPHATE SERPL-MCNC: 4.2 MG/DL (ref 2.5–4.9)
PLATELET # BLD AUTO: 173 X10*3/UL (ref 150–450)
POTASSIUM SERPL-SCNC: 3.4 MMOL/L (ref 3.5–5.3)
POTASSIUM SERPL-SCNC: 4.2 MMOL/L (ref 3.5–5.3)
POTASSIUM UR-SCNC: 17 MMOL/L
POTASSIUM/CREAT UR-RTO: 8 MMOL/G CREAT
PROCALCITONIN SERPL-MCNC: 5.02 NG/ML
PROT SERPL-MCNC: 8.8 G/DL (ref 6.4–8.2)
Q ONSET: 222 MS
QRS COUNT: 22 BEATS
QRS DURATION: 86 MS
QT INTERVAL: 378 MS
QTC CALCULATION(BAZETT): 562 MS
QTC FREDERICIA: 492 MS
R AXIS: 79 DEGREES
RBC # BLD AUTO: 5.18 X10*6/UL (ref 4.5–5.9)
S PNEUM AG UR QL: NEGATIVE
SODIUM SERPL-SCNC: 121 MMOL/L (ref 136–145)
SODIUM SERPL-SCNC: 122 MMOL/L (ref 136–145)
SODIUM UR-SCNC: 36 MMOL/L
SODIUM/CREAT UR-RTO: 16 MMOL/G CREAT
T AXIS: 80 DEGREES
T OFFSET: 411 MS
T4 FREE SERPL-MCNC: 0.66 NG/DL (ref 0.78–1.48)
TSH SERPL-ACNC: 5.9 MIU/L (ref 0.44–3.98)
VENTRICULAR RATE: 133 BPM
WBC # BLD AUTO: 18.1 X10*3/UL (ref 4.4–11.3)

## 2024-01-31 PROCEDURE — 80069 RENAL FUNCTION PANEL: CPT | Mod: CCI

## 2024-01-31 PROCEDURE — 97161 PT EVAL LOW COMPLEX 20 MIN: CPT | Mod: GP

## 2024-01-31 PROCEDURE — 2500000004 HC RX 250 GENERAL PHARMACY W/ HCPCS (ALT 636 FOR OP/ED)

## 2024-01-31 PROCEDURE — 84484 ASSAY OF TROPONIN QUANT: CPT

## 2024-01-31 PROCEDURE — 87449 NOS EACH ORGANISM AG IA: CPT

## 2024-01-31 PROCEDURE — 84439 ASSAY OF FREE THYROXINE: CPT | Performed by: STUDENT IN AN ORGANIZED HEALTH CARE EDUCATION/TRAINING PROGRAM

## 2024-01-31 PROCEDURE — 36415 COLL VENOUS BLD VENIPUNCTURE: CPT | Performed by: STUDENT IN AN ORGANIZED HEALTH CARE EDUCATION/TRAINING PROGRAM

## 2024-01-31 PROCEDURE — 2500000004 HC RX 250 GENERAL PHARMACY W/ HCPCS (ALT 636 FOR OP/ED): Performed by: STUDENT IN AN ORGANIZED HEALTH CARE EDUCATION/TRAINING PROGRAM

## 2024-01-31 PROCEDURE — 70450 CT HEAD/BRAIN W/O DYE: CPT

## 2024-01-31 PROCEDURE — 94640 AIRWAY INHALATION TREATMENT: CPT

## 2024-01-31 PROCEDURE — 87081 CULTURE SCREEN ONLY: CPT

## 2024-01-31 PROCEDURE — 82436 ASSAY OF URINE CHLORIDE: CPT

## 2024-01-31 PROCEDURE — 87205 SMEAR GRAM STAIN: CPT

## 2024-01-31 PROCEDURE — 83935 ASSAY OF URINE OSMOLALITY: CPT | Performed by: STUDENT IN AN ORGANIZED HEALTH CARE EDUCATION/TRAINING PROGRAM

## 2024-01-31 PROCEDURE — 80053 COMPREHEN METABOLIC PANEL: CPT | Performed by: STUDENT IN AN ORGANIZED HEALTH CARE EDUCATION/TRAINING PROGRAM

## 2024-01-31 PROCEDURE — 87081 CULTURE SCREEN ONLY: CPT | Performed by: STUDENT IN AN ORGANIZED HEALTH CARE EDUCATION/TRAINING PROGRAM

## 2024-01-31 PROCEDURE — 1100000001 HC PRIVATE ROOM DAILY

## 2024-01-31 PROCEDURE — 2500000001 HC RX 250 WO HCPCS SELF ADMINISTERED DRUGS (ALT 637 FOR MEDICARE OP)

## 2024-01-31 PROCEDURE — 2500000002 HC RX 250 W HCPCS SELF ADMINISTERED DRUGS (ALT 637 FOR MEDICARE OP, ALT 636 FOR OP/ED)

## 2024-01-31 PROCEDURE — 70450 CT HEAD/BRAIN W/O DYE: CPT | Performed by: RADIOLOGY

## 2024-01-31 PROCEDURE — 83930 ASSAY OF BLOOD OSMOLALITY: CPT | Performed by: STUDENT IN AN ORGANIZED HEALTH CARE EDUCATION/TRAINING PROGRAM

## 2024-01-31 PROCEDURE — 87899 AGENT NOS ASSAY W/OPTIC: CPT

## 2024-01-31 PROCEDURE — 99291 CRITICAL CARE FIRST HOUR: CPT

## 2024-01-31 PROCEDURE — 83735 ASSAY OF MAGNESIUM: CPT | Performed by: STUDENT IN AN ORGANIZED HEALTH CARE EDUCATION/TRAINING PROGRAM

## 2024-01-31 PROCEDURE — 85025 COMPLETE CBC W/AUTO DIFF WBC: CPT | Performed by: STUDENT IN AN ORGANIZED HEALTH CARE EDUCATION/TRAINING PROGRAM

## 2024-01-31 RX ORDER — POTASSIUM CHLORIDE 20 MEQ/1
40 TABLET, EXTENDED RELEASE ORAL ONCE
Status: COMPLETED | OUTPATIENT
Start: 2024-01-31 | End: 2024-01-31

## 2024-01-31 RX ORDER — IPRATROPIUM BROMIDE AND ALBUTEROL SULFATE 2.5; .5 MG/3ML; MG/3ML
3 SOLUTION RESPIRATORY (INHALATION)
Status: DISCONTINUED | OUTPATIENT
Start: 2024-02-01 | End: 2024-02-01

## 2024-01-31 RX ORDER — MAGNESIUM SULFATE HEPTAHYDRATE 40 MG/ML
2 INJECTION, SOLUTION INTRAVENOUS ONCE
Status: COMPLETED | OUTPATIENT
Start: 2024-01-31 | End: 2024-01-31

## 2024-01-31 RX ORDER — POTASSIUM CHLORIDE 14.9 MG/ML
20 INJECTION INTRAVENOUS ONCE
Status: COMPLETED | OUTPATIENT
Start: 2024-01-31 | End: 2024-01-31

## 2024-01-31 RX ORDER — SODIUM CHLORIDE, SODIUM LACTATE, POTASSIUM CHLORIDE, CALCIUM CHLORIDE 600; 310; 30; 20 MG/100ML; MG/100ML; MG/100ML; MG/100ML
75 INJECTION, SOLUTION INTRAVENOUS CONTINUOUS
Status: DISCONTINUED | OUTPATIENT
Start: 2024-01-31 | End: 2024-02-01

## 2024-01-31 RX ADMIN — Medication 5 MG: at 21:48

## 2024-01-31 RX ADMIN — AZITHROMYCIN 500 MG: 500 INJECTION, POWDER, LYOPHILIZED, FOR SOLUTION INTRAVENOUS at 17:29

## 2024-01-31 RX ADMIN — IPRATROPIUM BROMIDE AND ALBUTEROL SULFATE 3 ML: .5; 3 SOLUTION RESPIRATORY (INHALATION) at 07:45

## 2024-01-31 RX ADMIN — OSELTAMIVIR PHOSPHATE 75 MG: 75 CAPSULE ORAL at 20:33

## 2024-01-31 RX ADMIN — OSELTAMIVIR PHOSPHATE 75 MG: 75 CAPSULE ORAL at 09:00

## 2024-01-31 RX ADMIN — POTASSIUM CHLORIDE 20 MEQ: 14.9 INJECTION, SOLUTION INTRAVENOUS at 01:54

## 2024-01-31 RX ADMIN — SODIUM CHLORIDE, POTASSIUM CHLORIDE, SODIUM LACTATE AND CALCIUM CHLORIDE 75 ML/HR: 600; 310; 30; 20 INJECTION, SOLUTION INTRAVENOUS at 17:30

## 2024-01-31 RX ADMIN — HEPARIN SODIUM 5000 UNITS: 5000 INJECTION INTRAVENOUS; SUBCUTANEOUS at 08:00

## 2024-01-31 RX ADMIN — MAGNESIUM SULFATE HEPTAHYDRATE 2 G: 40 INJECTION, SOLUTION INTRAVENOUS at 01:54

## 2024-01-31 RX ADMIN — IPRATROPIUM BROMIDE AND ALBUTEROL SULFATE 3 ML: .5; 3 SOLUTION RESPIRATORY (INHALATION) at 19:00

## 2024-01-31 RX ADMIN — CEFTRIAXONE SODIUM 1 G: 1 INJECTION, SOLUTION INTRAVENOUS at 19:05

## 2024-01-31 RX ADMIN — POTASSIUM CHLORIDE 40 MEQ: 1500 TABLET, EXTENDED RELEASE ORAL at 10:34

## 2024-01-31 RX ADMIN — HEPARIN SODIUM 5000 UNITS: 5000 INJECTION INTRAVENOUS; SUBCUTANEOUS at 20:33

## 2024-01-31 RX ADMIN — HEPARIN SODIUM 5000 UNITS: 5000 INJECTION INTRAVENOUS; SUBCUTANEOUS at 12:34

## 2024-01-31 SDOH — ECONOMIC STABILITY: FOOD INSECURITY: WITHIN THE PAST 12 MONTHS, THE FOOD YOU BOUGHT JUST DIDN'T LAST AND YOU DIDN'T HAVE MONEY TO GET MORE.: NEVER TRUE

## 2024-01-31 SDOH — SOCIAL STABILITY: SOCIAL INSECURITY: WITHIN THE LAST YEAR, HAVE YOU BEEN HUMILIATED OR EMOTIONALLY ABUSED IN OTHER WAYS BY YOUR PARTNER OR EX-PARTNER?: NO

## 2024-01-31 SDOH — SOCIAL STABILITY: SOCIAL INSECURITY
WITHIN THE LAST YEAR, HAVE YOU BEEN KICKED, HIT, SLAPPED, OR OTHERWISE PHYSICALLY HURT BY YOUR PARTNER OR EX-PARTNER?: NO

## 2024-01-31 SDOH — SOCIAL STABILITY: SOCIAL INSECURITY: WITHIN THE LAST YEAR, HAVE YOU BEEN AFRAID OF YOUR PARTNER OR EX-PARTNER?: NO

## 2024-01-31 SDOH — ECONOMIC STABILITY: INCOME INSECURITY: IN THE PAST 12 MONTHS, HAS THE ELECTRIC, GAS, OIL, OR WATER COMPANY THREATENED TO SHUT OFF SERVICE IN YOUR HOME?: NO

## 2024-01-31 SDOH — SOCIAL STABILITY: SOCIAL INSECURITY
WITHIN THE LAST YEAR, HAVE TO BEEN RAPED OR FORCED TO HAVE ANY KIND OF SEXUAL ACTIVITY BY YOUR PARTNER OR EX-PARTNER?: NO

## 2024-01-31 SDOH — ECONOMIC STABILITY: FOOD INSECURITY: WITHIN THE PAST 12 MONTHS, YOU WORRIED THAT YOUR FOOD WOULD RUN OUT BEFORE YOU GOT MONEY TO BUY MORE.: NEVER TRUE

## 2024-01-31 ASSESSMENT — COGNITIVE AND FUNCTIONAL STATUS - GENERAL
PATIENT BASELINE BEDBOUND: NO
MOVING TO AND FROM BED TO CHAIR: A LITTLE
STANDING UP FROM CHAIR USING ARMS: A LITTLE
WALKING IN HOSPITAL ROOM: A LITTLE
CLIMB 3 TO 5 STEPS WITH RAILING: A LITTLE
DAILY ACTIVITIY SCORE: 24
TURNING FROM BACK TO SIDE WHILE IN FLAT BAD: A LITTLE
MOBILITY SCORE: 19
MOBILITY SCORE: 24

## 2024-01-31 ASSESSMENT — PAIN SCALES - GENERAL
PAINLEVEL_OUTOF10: 0 - NO PAIN

## 2024-01-31 ASSESSMENT — PAIN - FUNCTIONAL ASSESSMENT
PAIN_FUNCTIONAL_ASSESSMENT: 0-10
PAIN_FUNCTIONAL_ASSESSMENT: 0-10

## 2024-01-31 ASSESSMENT — ACTIVITIES OF DAILY LIVING (ADL): LACK_OF_TRANSPORTATION: NO

## 2024-01-31 NOTE — HOSPITAL COURSE
Quincy Lee is a 43-year-old male with past medical history of asthma who presented to ED with 2-3 days of flu like illness. On presentation afebrile with hypotension to 87/58 and tachycardia to 112 with O2 sats appropriate on RA. Initial workup revealing of leukocytosis to 17.9 with left shift, hyponatremia of 122, significant SURENDRA with a creatinine of 3.09 (up from normal baseline). Troponin elevation to 173 -> 177 -> 174 ->166->142.  BNP elevated at 236->113. Flu a positive. VBG Ph7.29/PCO2 40 lactate 5.3. EKG performed showed sinus tachycardia without ischemic changes.  CT PE with evidence of bilateral infiltrates consistent with a viral or atypical pneumonia. Blood cultures were obtained and the patient was started on azithromycin, Rocephin, and Tamiflu. He was given a 1 L fluid bolus with improvement in his lactate and creatinine.     Patient was evaluated by the CICU fellow who performed bedside echo revealing diminished systolic function with global hypokinesia (no associated pericardial effusion).  He was admitted to the MICU for influenza pneumonia and myocarditis with hemodynamic instability and evidence of cardiac dysfunction and hypoperfusion.    Formal echo revealed mildly decreased left ventricular systolic function at 40 to 45% estimated EF with global hypokinesis of left ventricle and mildly reduced right ventricular systolic function. He was formally evaluated by Cardiology who felt down trending troponin is inconsistent with active myocarditis and no signs on echo to suggest perimyocarditis save mildly reduced EF. Recommended initiating Metoprolol 12.5 mg every day with consideration of starting Entresto outpatient. Can consider stress cMRI, likely as an outpt, which would clarify if ischemia (unlikely) or myocarditis may be present.    In the MICU continued to be stable on 4-5 L via NC satting well, his trop/BNP were trending down, and SURENDRA improved with IV fluid. As his oxygen requirements where  stable and his hemodynamic instability improved with IVF he was deemed appropriate for GMF.    On arrival to floor he was on 5 L of nasal cannula, complaining of diarrhea, and bilateral calf pain. C.diff negative, BL LE US no acute DVT. He was weaned to room air. However, he developed melanotic stools associated with Hgb drop to 6.9 requiring 1 unit pRBC transfusion. Evaluated by GI and underwent EGD revealing of a single, 10 mm, round, cratered ulcer with nonbleeding visible vessel (Pavel IIA) was found in the incisura. Attempted to place hemostatic clips (4) but none were effective due to position difficulty with deployment. Injected 8 mL of epinephrine. No bleeding during or after intervention. Performed cold forceps biopsies, results pending on discharge. GI recommending PO PPI BID for 2 weeks then once daily thereafter. He was deemed medically stable for discharge following as his hemoglobin remained stable and he remained on RA.     To do:  [  ] follow up with GI  [  ] Follow up EGD Bx for H pylori   [  ] Follow up with Cardiology for consideration of Entresto and cMRI  [  ] follow up with pulm for formal PFT and asthma management     Vitals:    02/06/24 0751   BP: 100/68   Pulse: 91   Resp:    Temp:    SpO2:       Exam:  General: Resting comfortably in bed. Well developed, well nourished. Appears stated age. In no acute distress   HEENT: Normocephalic and atraumatic. EOMI, sclera non-icteric, MMM, no JVD  Cardiovascular: RRR, no r/m/g  Pulmonary: Lungs course bilaterally. On RA, does not appear dyspneic   GI: +BS, soft, non-tender, non-distended  : No suprapubic/ flank pain  Extremities: No LE edema   Skin: warm and dry. No rashes or lesions   Neurologic: CN II-XII grossly intact. No focal neurologic deficit   Psych: Pleasant. Appropriate mood and affect

## 2024-01-31 NOTE — PROGRESS NOTES
Quincy Lee is a 43 y.o. male on day 1 of admission presenting with viral myocarditis.       Subjective   Patient was seen and examined today. Patient is complaining of some SOB, but denied any CP, palpitation or any other symptoms. This morning patient was desatting to 88 was given Duonebx1 and his sat improved to 98%, patient is sating well on 4-5 L.        Objective     Last Recorded Vitals  BP 84/68   Pulse 102   Temp 36.7 °C (98.1 °F) (Temporal)   Resp 23   Wt 86.2 kg (190 lb)   SpO2 (!) 88%   Intake/Output last 3 Shifts:    Intake/Output Summary (Last 24 hours) at 1/31/2024 1021  Last data filed at 1/31/2024 0800  Gross per 24 hour   Intake 1550 ml   Output 200 ml   Net 1350 ml       Admission Weight  Weight: 86.2 kg (190 lb) (01/30/24 0906)    Daily Weight  01/30/24 : 86.2 kg (190 lb)    Image Results  CT head (1/31/2024)  IMPRESSION:  No evidence of intracranial hemorrhage or displaced skull fracture.     CT angio (1/31/2024)  IMPRESSION:  No evidence of acute pulmonary embolism. Extensive diffuse bilateral  pneumonia sparing the left upper lobe. Extensive bronchiectasis.  Reactive mediastinal and hilar lymphadenopathy.    Physical Exam  Appearance: Alert, oriented , cooperative,  in no acute distress. Well nourished & well hydrated.  Skin: Intact,  dry skin, no lesions, rash, petechiae or purpura.   Eyes: PERRLA, EOMs intact,  No scleral injection. No scleral icterus.   ENT: Hearing grossly intact. External auditory canals patent. Nares patent, mucus membranes moist. Dentition without lesions.   Neck: Supple, without meningismus. Trachea at midline.   Pulmonary: There are scattered rhonchorous breath sounds and inspiratory crackles heard in bilateral lung fields.    Cardiac: Tachycardic. Normal S1, S2 without murmur, rub, gallop or extrasystole. No JVD, Carotids without bruits.  Abdomen: Soft, nontender.  No palpable organomegaly.  No rebound or guarding.    Genitourinary: Exam  deferred.  Musculoskeletal:  no edema, or deformity. Pulses full and equal. No cyanosis or clubbing.  Neurological:  Alert and oriented x 3 GCS is 15.Moving all 4 extremities equally, sensation to light touch intact distally in all 4 extremities.  No focal findings identified  Psychiatric: Appropriate mood and affect.   Relevant Results  Results for orders placed or performed during the hospital encounter of 01/30/24 (from the past 24 hour(s))   Troponin I, High Sensitivity, Initial   Result Value Ref Range    Troponin I, High Sensitivity 177 (HH) 0 - 53 ng/L   Troponin, High Sensitivity, 1 Hour   Result Value Ref Range    Troponin I, High Sensitivity 174 (HH) 0 - 53 ng/L   Transthoracic Echo (TTE) Complete   Result Value Ref Range    AV pk kishore 1.68 m/s    LVOT diam 2.00 cm    LV biplane EF 43 %    LA vol index A/L 18.4 ml/m2    Tricuspid annular plane systolic excursion 0.8 cm    RV free wall pk S' 7.72 cm/s    LVIDd 5.47 cm    Aortic Valve Area by Continuity of Peak Velocity 1.91 cm2    AV pk grad 11.3 mmHg    LV A4C EF 55.0    Blood Culture    Specimen: Peripheral Venipuncture; Blood culture   Result Value Ref Range    Blood Culture Loaded on Instrument - Culture in progress    Blood Culture    Specimen: Peripheral Venipuncture; Blood culture   Result Value Ref Range    Blood Culture Loaded on Instrument - Culture in progress    Troponin I, High Sensitivity   Result Value Ref Range    Troponin I, High Sensitivity 166 (HH) 0 - 53 ng/L   BLOOD GAS VENOUS FULL PANEL   Result Value Ref Range    POCT pH, Venous 7.25 (LL) 7.33 - 7.43 pH    POCT pCO2, Venous 46 41 - 51 mm Hg    POCT pO2, Venous 28 (L) 35 - 45 mm Hg    POCT SO2, Venous 25 (L) 45 - 75 %    POCT Oxy Hemoglobin, Venous 24.5 (L) 45.0 - 75.0 %    POCT Hematocrit Calculated, Venous 52.0 41.0 - 52.0 %    POCT Sodium, Venous 122 (L) 136 - 145 mmol/L    POCT Potassium, Venous 2.9 (LL) 3.5 - 5.3 mmol/L    POCT Chloride, Venous 88 (L) 98 - 107 mmol/L    POCT  Ionized Calicum, Venous 1.09 (L) 1.10 - 1.33 mmol/L    POCT Glucose, Venous 115 (H) 74 - 99 mg/dL    POCT Lactate, Venous 3.6 (H) 0.4 - 2.0 mmol/L    POCT Base Excess, Venous -7.2 (L) -2.0 - 3.0 mmol/L    POCT HCO3 Calculated, Venous 20.2 (L) 22.0 - 26.0 mmol/L    POCT Hemoglobin, Venous 17.2 13.5 - 17.5 g/dL    POCT Anion Gap, Venous 17.0 10.0 - 25.0 mmol/L    Patient Temperature 37.0 degrees Celsius    FiO2 32 %   Renal function panel   Result Value Ref Range    Glucose 98 74 - 99 mg/dL    Sodium 123 (L) 136 - 145 mmol/L    Potassium 3.1 (L) 3.5 - 5.3 mmol/L    Chloride 87 (L) 98 - 107 mmol/L    Bicarbonate 16 (L) 21 - 32 mmol/L    Anion Gap 23 (H) 10 - 20 mmol/L    Urea Nitrogen 25 (H) 6 - 23 mg/dL    Creatinine 2.19 (H) 0.50 - 1.30 mg/dL    eGFR 37 (L) >60 mL/min/1.73m*2    Calcium 9.4 8.6 - 10.6 mg/dL    Phosphorus 6.1 (H) 2.5 - 4.9 mg/dL    Albumin 4.5 3.4 - 5.0 g/dL   Troponin I, High Sensitivity   Result Value Ref Range    Troponin I, High Sensitivity 179 (HH) 0 - 53 ng/L   Blood Gas Venous Full Panel   Result Value Ref Range    POCT pH, Venous 7.34 7.33 - 7.43 pH    POCT pCO2, Venous 36 (L) 41 - 51 mm Hg    POCT pO2, Venous 23 (L) 35 - 45 mm Hg    POCT SO2, Venous 22 (L) 45 - 75 %    POCT Oxy Hemoglobin, Venous 22.0 (L) 45.0 - 75.0 %    POCT Hematocrit Calculated, Venous 50.0 41.0 - 52.0 %    POCT Sodium, Venous 121 (L) 136 - 145 mmol/L    POCT Potassium, Venous 2.9 (LL) 3.5 - 5.3 mmol/L    POCT Chloride, Venous 92 (L) 98 - 107 mmol/L    POCT Ionized Calicum, Venous 1.06 (L) 1.10 - 1.33 mmol/L    POCT Glucose, Venous 113 (H) 74 - 99 mg/dL    POCT Lactate, Venous 1.9 0.4 - 2.0 mmol/L    POCT Base Excess, Venous -5.6 (L) -2.0 - 3.0 mmol/L    POCT HCO3 Calculated, Venous 19.4 (L) 22.0 - 26.0 mmol/L    POCT Hemoglobin, Venous 16.8 13.5 - 17.5 g/dL    POCT Anion Gap, Venous 13.0 10.0 - 25.0 mmol/L    Patient Temperature 37.0 degrees Celsius    FiO2 40 %   Magnesium   Result Value Ref Range    Magnesium 1.52 (L)  1.60 - 2.40 mg/dL   Phosphorus   Result Value Ref Range    Phosphorus 5.5 (H) 2.5 - 4.9 mg/dL   B-Type Natriuretic Peptide   Result Value Ref Range     (H) 0 - 99 pg/mL   Coagulation Screen   Result Value Ref Range    Protime 12.3 9.8 - 12.8 seconds    INR 1.1 0.9 - 1.1    aPTT 26 (L) 27 - 38 seconds   CBC and Auto Differential   Result Value Ref Range    WBC 17.2 (H) 4.4 - 11.3 x10*3/uL    nRBC 0.3 (H) 0.0 - 0.0 /100 WBCs    RBC 5.35 4.50 - 5.90 x10*6/uL    Hemoglobin 16.2 13.5 - 17.5 g/dL    Hematocrit 43.8 41.0 - 52.0 %    MCV 82 80 - 100 fL    MCH 30.3 26.0 - 34.0 pg    MCHC 37.0 (H) 32.0 - 36.0 g/dL    RDW 13.5 11.5 - 14.5 %    Platelets 180 150 - 450 x10*3/uL    Neutrophils % 89.1 40.0 - 80.0 %    Immature Granulocytes %, Automated 0.6 0.0 - 0.9 %    Lymphocytes % 7.8 13.0 - 44.0 %    Monocytes % 2.2 2.0 - 10.0 %    Eosinophils % 0.0 0.0 - 6.0 %    Basophils % 0.3 0.0 - 2.0 %    Neutrophils Absolute 15.31 (H) 1.20 - 7.70 x10*3/uL    Immature Granulocytes Absolute, Automated 0.10 0.00 - 0.70 x10*3/uL    Lymphocytes Absolute 1.34 1.20 - 4.80 x10*3/uL    Monocytes Absolute 0.37 0.10 - 1.00 x10*3/uL    Eosinophils Absolute 0.00 0.00 - 0.70 x10*3/uL    Basophils Absolute 0.05 0.00 - 0.10 x10*3/uL   Comprehensive metabolic panel   Result Value Ref Range    Glucose 93 74 - 99 mg/dL    Sodium 124 (L) 136 - 145 mmol/L    Potassium 2.9 (LL) 3.5 - 5.3 mmol/L    Chloride 90 (L) 98 - 107 mmol/L    Bicarbonate 18 (L) 21 - 32 mmol/L    Anion Gap 19 10 - 20 mmol/L    Urea Nitrogen 27 (H) 6 - 23 mg/dL    Creatinine 2.11 (H) 0.50 - 1.30 mg/dL    eGFR 39 (L) >60 mL/min/1.73m*2    Calcium 8.7 8.6 - 10.6 mg/dL    Albumin 4.2 3.4 - 5.0 g/dL    Alkaline Phosphatase 96 33 - 120 U/L    Total Protein 9.1 (H) 6.4 - 8.2 g/dL     (H) 9 - 39 U/L    Bilirubin, Total 0.3 0.0 - 1.2 mg/dL    ALT 21 10 - 52 U/L   Lipid Panel   Result Value Ref Range    Cholesterol 125 0 - 199 mg/dL    HDL-Cholesterol 50.8 mg/dL    Cholesterol/HDL  Ratio 2.5     LDL Calculated 54 <=99 mg/dL    VLDL 20 0 - 40 mg/dL    Triglycerides 102 0 - 149 mg/dL    Non HDL Cholesterol 74 0 - 149 mg/dL   Procalcitonin   Result Value Ref Range    Procalcitonin 5.02 (H) <=0.07 ng/mL   TSH with reflex to Free T4 if abnormal   Result Value Ref Range    Thyroid Stimulating Hormone 5.90 (H) 0.44 - 3.98 mIU/L   ECG 12 Lead   Result Value Ref Range    Ventricular Rate 107 BPM    Atrial Rate 107 BPM    VA Interval 154 ms    QRS Duration 100 ms    QT Interval 354 ms    QTC Calculation(Bazett) 472 ms    P Axis 74 degrees    R Axis 40 degrees    T Axis 70 degrees    QRS Count 17 beats    Q Onset 217 ms    P Onset 140 ms    P Offset 200 ms    T Offset 394 ms    QTC Fredericia 429 ms   POCT GLUCOSE   Result Value Ref Range    POCT Glucose 114 (H) 74 - 99 mg/dL   Magnesium   Result Value Ref Range    Magnesium 2.44 (H) 1.60 - 2.40 mg/dL   CBC and Auto Differential   Result Value Ref Range    WBC 18.1 (H) 4.4 - 11.3 x10*3/uL    nRBC 0.2 (H) 0.0 - 0.0 /100 WBCs    RBC 5.18 4.50 - 5.90 x10*6/uL    Hemoglobin 15.7 13.5 - 17.5 g/dL    Hematocrit 42.1 41.0 - 52.0 %    MCV 81 80 - 100 fL    MCH 30.3 26.0 - 34.0 pg    MCHC 37.3 (H) 32.0 - 36.0 g/dL    RDW 13.4 11.5 - 14.5 %    Platelets 173 150 - 450 x10*3/uL    Neutrophils % 88.1 40.0 - 80.0 %    Immature Granulocytes %, Automated 0.7 0.0 - 0.9 %    Lymphocytes % 8.4 13.0 - 44.0 %    Monocytes % 2.1 2.0 - 10.0 %    Eosinophils % 0.0 0.0 - 6.0 %    Basophils % 0.7 0.0 - 2.0 %    Neutrophils Absolute 15.92 (H) 1.20 - 7.70 x10*3/uL    Immature Granulocytes Absolute, Automated 0.12 0.00 - 0.70 x10*3/uL    Lymphocytes Absolute 1.52 1.20 - 4.80 x10*3/uL    Monocytes Absolute 0.38 0.10 - 1.00 x10*3/uL    Eosinophils Absolute 0.00 0.00 - 0.70 x10*3/uL    Basophils Absolute 0.12 (H) 0.00 - 0.10 x10*3/uL   Comprehensive metabolic panel   Result Value Ref Range    Glucose 89 74 - 99 mg/dL    Sodium 121 (L) 136 - 145 mmol/L    Potassium 4.2 3.5 - 5.3 mmol/L     Chloride 93 (L) 98 - 107 mmol/L    Bicarbonate 12 (L) 21 - 32 mmol/L    Anion Gap 20 10 - 20 mmol/L    Urea Nitrogen 28 (H) 6 - 23 mg/dL    Creatinine 1.51 (H) 0.50 - 1.30 mg/dL    eGFR 58 (L) >60 mL/min/1.73m*2    Calcium 8.8 8.6 - 10.6 mg/dL    Albumin 4.0 3.4 - 5.0 g/dL    Alkaline Phosphatase 88 33 - 120 U/L    Total Protein 8.8 (H) 6.4 - 8.2 g/dL     (H) 9 - 39 U/L    Bilirubin, Total 0.3 0.0 - 1.2 mg/dL    ALT 22 10 - 52 U/L   Thyroxine, Free   Result Value Ref Range    Thyroxine, Free 0.66 (L) 0.78 - 1.48 ng/dL   Osmolality, urine   Result Value Ref Range    Osmolality, Urine Random 480 200 - 1,200 mOsm/kg   Troponin I, High Sensitivity   Result Value Ref Range    Troponin I, High Sensitivity 142 (HH) 0 - 53 ng/L   Renal function panel   Result Value Ref Range    Glucose 103 (H) 74 - 99 mg/dL    Sodium 122 (L) 136 - 145 mmol/L    Potassium 3.4 (L) 3.5 - 5.3 mmol/L    Chloride 94 (L) 98 - 107 mmol/L    Bicarbonate 15 (L) 21 - 32 mmol/L    Anion Gap 16 10 - 20 mmol/L    Urea Nitrogen 28 (H) 6 - 23 mg/dL    Creatinine 1.64 (H) 0.50 - 1.30 mg/dL    eGFR 53 (L) >60 mL/min/1.73m*2    Calcium 8.7 8.6 - 10.6 mg/dL    Phosphorus 4.2 2.5 - 4.9 mg/dL    Albumin 4.0 3.4 - 5.0 g/dL   Osmolality   Result Value Ref Range    Osmolality, Serum 265 (L) 280 - 300 mOsm/kg        Assessment/Plan   The patient is a 43-year-old male with past medical history of asthma who was admitted to the MICU for influenza pneumonia and myocarditis with hemodynamic instability and evidence of cardiac dysfunction and hypoperfusion.      Updates (1/31):  - Follow up MRSA nares, strep/legionella  - Follow up urine lytes for hyponatremia    - c/w Ceftriaxone/Azithromycin c/f atypical pneumonia (1/30- )  - c/w Tamiflu viral Pneumonia/myocarditis (1/30-)       Neurology:   -No active issues, patient mentating appropriately without encephalopathy.  -tylenol PRN for mild/moderate pain.   -dilaudid for severe pain      Cardiology:   #viral  myocarditis with reduced EF  -EF 40-45% on TTE done today.   -Troponins elevated but now downtrending X2, BNP elevated, now down trending   -supportive care, standard HF treatment if patient's cardiac Function worsens or he begins to demonstrate signs/symptoms of decompensated heart failure  -judicious use of fluids in light of reduced EF   -Seen by cardiology in ED     #hypotension   -Likely hypovolemic as patient has been normotensive since fluid resuscitation in the ED.  -POCUS on arrival to MICU demonstrated A completely collapsible, very thin IVC suggesting that patient still has room for fluids.  -500CC crystalloid bolus on arrival to MICU      #Tachycardia (Resolved)   -likely multifactorial from myocarditis and dehydration.        Pulmonary:  #h/o asthma with mild viral induced asthma exacerbation  -albuterol PRNq6 ordered for wheezing/dyspnea        #atypical vs viral pneumonia  -Diffuse, patchy, bilateral infiltrates noted on CT PE likely representing a viral pneumonia versus less likely atypical bacterial pneumonia.  -Pt with 4-5L oxygen requirement, will wean as tolerated.      FEN/GI:  -No active issues  -Cardiac diet given evidence of cardiac dysfunction for now      Renal:  #SURENDRA  -Initial creatinine around 3.0 from a baseline of normal.  After fluid administration, creatinine down trended to the low twos.  -Likely prerenal in etiology given poor p.o. intake, insensible losses and diminished cardiac output.  -Strict I's and O's     #Hyponatremia   -122 on arrival, uptrended to 124, 122 (1/31)  -likely due to hypovolemia and insensible losses   -normal mentation, no indication for hypertonic saline   -will continue to trend  -BID RFPs     #hypokalemia   -Potassium 2.9 on arrival to MICU, 3.4 (1/31)  -Again likely due to dehydration,insensible losses and poor p.o. intake  -Potassium replenishment ordered  -BID RFPs     ID:   #flu A infection with viral vs atypical pneumonia   -Follow-up blood cultures  drawn in the ED x 2  -Continue ceftriaxone and azithromycin  -Follow up MRSA nares due to his risk of superimposed MRSA pneumonia in the setting of severe flu infection  -Continue Tamiflu 75 mg twice daily x 5 days    F: PRN  E: potassium 40 meq PO  GI: Not indicated   A: PIV  DVT ppx: Heparin   O2: 4-5 L via NC  Code Status: Full code    Aaron Guerrero MD  Internal Medicine, PGY1   MICU ([facility] Team)

## 2024-01-31 NOTE — H&P
History Of Present Illness  The patient is a 43-year-old male with past medical history of asthma who was admitted to the MICU for influenza pneumonia and myocarditis with hemodynamic instability and evidence of cardiac dysfunction and hypoperfusion.Patient presented to our ER today (1/30/2024) with 2 to 3-day history of a flulike illness.  His symptomatology included fatigue, shortness of breath, malaise, pleuritic chest pain, nonbloody diarrhea and poor p.o. intake.  He started to exhibit a worsening cough with shortness of breath and had no albuterol inhaler at home and his chest discomfort worsened as well which prompted him to present to the emergency department.      In the ED patient's vitals in triage T 36.8 °C (98.2 °F)    BP 87/58  RR 17  O2 98 %.     EKG performed showed sinus tachycardia without ischemic changes.  Initial VBG Ph7.29/PCO2 40 lactate 5.3.  Basic labs showed leukocytosis 17.9 with left shift, hyponatremia of 122, significant SURENDRA with a creatinine of 3.09 (up from normal baseline). patient found to have troponin elevation to 173->177->174->166.  BNP elevated at 236.  Patient found to be flu a positive and CT PE performed showed evidence of bilateral infiltrates consistent with a viral or atypical pneumonia.  Due to concern for viral myocarditis, bedside POCUS was performed and showed diminished systolic function with global hypokinesia (no associated pericardial effusion) and so formal echo was obtained.  Formal echo revealed mildly decreased left ventricular systolic function at 40 to 45% estimated EF.  There is global hypokinesis of left ventricle and mildly reduced right ventricular systolic function.    Blood cultures were obtained and the patient was started on ceftriaxone and Rocephin.  He was given a 1 L fluid bolus with improvement in his lactate and creatinine.  Patient was evaluated by the CICU who saw him at bedside advised MICU admission for myocarditis and electrolyte  abnormalities.  Patient did have a couple of desaturations to the low 90s/upper 80s and was placed on 3 L of oxygen.  He was also treated with DuoNebs as there is an element of asthma exacerbation at play with obvious wheezing on initial evaluation.    On arrival to MICU, Pt afebrile, , /83, RR26, 97% on 3L. Patient with no additional complaints.  Denies any change in the quality or severity of his chest discomfort and states it is mostly when he is coughing that he feels this discomfort.     Past Medical History  No past medical history on file.    Surgical History  No past surgical history on file.     Social History  He has no history on file for tobacco use, alcohol use, and drug use.    Family History  No family history on file.     Allergies  Patient has no known allergies.    Review of Systems   A full 10 point review of systems is reviewed with patient is negative unless stated in HPI.  Physical Exam     Physical Exam:    Appearance: Alert, oriented , cooperative,  in no acute distress. Well nourished & well hydrated.    Skin: Intact,  dry skin, no lesions, rash, petechiae or purpura.     Eyes: PERRLA, EOMs intact,  No scleral injection. No scleral icterus.     ENT: Hearing grossly intact. External auditory canals patent. Nares patent, mucus membranes moist. Dentition without lesions.     Neck: Supple, without meningismus. Trachea at midline.     Pulmonary: There are scattered rhonchorous breath sounds and inspiratory crackles heard in bilateral lung fields.  Faint end expiratory wheezes heard throughout as well.No accessory muscle use or stridor.    Cardiac: Tachycardic. Normal S1, S2 without murmur, rub, gallop or extrasystole. No JVD, Carotids without bruits.    Abdomen: Soft, nontender.  No palpable organomegaly.  No rebound or guarding.      Genitourinary: Exam deferred.    Musculoskeletal:  no edema, or deformity. Pulses full and equal. No cyanosis or clubbing.    Neurological:  Alert and  oriented x 3 GCS is 15.Moving all 4 extremities equally, sensation to light touch intact distally in all 4 extremities.  No focal findings identified    Psychiatric: Appropriate mood and affect.     Last Recorded Vitals  Blood pressure 112/83, pulse (!) 142, temperature 37.4 °C (99.3 °F), temperature source Temporal, resp. rate 26, height 1.829 m (6'), weight 86.2 kg (190 lb), SpO2 97 %.    Relevant Results        Transthoracic Echo (TTE) Complete    Result Date: 1/30/2024  1. Left ventricular systolic function is mildly decreased with a 40-45% estimated ejection fraction.  2. There is global hypokinesis of the left ventricle with minor regional variations.  3. There is mildly reduced right ventricular systolic function.  4. Mild to moderate aortic valve regurgitation.  5. Normal aortic root.  CT angio chest for pulmonary embolism    Addendum Date: 1/30/2024    NOTIFICATION:  Non-critical findings were relayed by Radiology Support Staff to Dr mis Pompa on 1/30/2024 at 12:10 hours. Signed by Rambo Carrasco MD    Result Date: 1/30/2024  STUDY: CT Angiogram of the Chest; 1/30/24 at 11:42 AM FINDINGS: Pulmonary arteries are adequately opacified without acute or chronic filling defects.  The thoracic aorta is normal in course and caliber without dissection or aneurysm. The heart is normal in size without pericardial effusion.  Mildly enlarged mediastinal and hilar lymph nodes. There is no pleural effusion, pleural thickening, or pneumothorax. Extensive bilateral bronchiectasis. Prominent bilateral peribronchial thickening noted as well as focal areas of pulmonary consolidation consistent with diffuse bilateral pneumonia sparing the left upper lobe. Upper abdomen demonstrates no acute pathology. There are no acute fractures.  No suspicious bony lesions.    No evidence of acute pulmonary embolism. Extensive diffuse bilateral pneumonia sparing the left upper lobe. Extensive bronchiectasis. Reactive mediastinal and  hilar lymphadenopathy. Signed by Rambo Carrasco MD         Assessment/Plan   Principal Problem:    Flu  The patient is a 43-year-old male with past medical history of asthma who was admitted to the MICU for influenza pneumonia and myocarditis with hemodynamic instability and evidence of cardiac dysfunction and hypoperfusion.    Neurology:   -No active issues, patient mentating appropriately without encephalopathy.  -tylenol PRN for mild/moderate pain.   -dilaudid for severe pain     Cardiology:   #viral myocarditis with reduced EF  -EF 40-45% on TTE done today.   -Troponins elevated but now downtrending X2, BNP elevated  -supportive care, standard HF treatment if patient's cardiac Function worsens or he begins to demonstrate signs/symptoms of decompensated heart failure  -judicious use of fluids in light of reduced EF   -Seen by cardiology in ED    #hypotension   -Likely hypovolemic as patient has been normotensive since fluid resuscitation in the ED.  -POCUS on arrival to MICU demonstrated A completely collapsible, very thin IVC suggesting that patient still has room for fluids.  -ordering 500CC crystalloid bolus on arrival to MICU     #Tachycardia  -likely multifactorial from myocarditis and dehydration.   - on arrival to MICU which is worse than it was earlier in the ED, obtaining EKG to rule out tachydysrhythmia which the patient is at risk for.     Pulmonary:  #h/o asthma with mild viral induced asthma exacerbation  -albuterol PRNq6 ordered for wheezing/dyspnea   -prednisone?     #atypical vs viral pneumonia  -Diffuse, patchy, bilateral infiltrates noted on CT PE likely representing a viral pneumonia versus less likely atypical bacterial pneumonia.  -Pt with 3L oxygen requirement, will wean as tolerated.     FEN/GI:  -No active issues  -Cardiac diet given evidence of cardiac dysfunction for now     Renal:  #SURENDRA  -Initial creatinine around 3.0 from a baseline of normal.  After fluid administration,  creatinine down trended to the low twos.  -Likely prerenal in etiology given poor p.o. intake, insensible losses and diminished cardiac output.  -Strict I's and O's    #Hyponatremia   -122 on arrival, uptrended to 124  -likely due to hypovolemia and insensible losses   -normal mentation, no indication for hypertonic saline   -will continue to trend  -BID RFPs    #hypokalemia   -Potassium 2.9 on arrival to MICU  -Again likely due to dehydration,insensible losses and poor p.o. intake  -Potassium replenishment ordered  -BID RFPs    ID:   #flu A infection with viral vs atypical pneumonia   -Follow-up blood cultures drawn in the ED x 2  -Continue ceftriaxone and azithromycin  -Obtain MRSA nares due to his risk of superimposed MRSA pneumonia in the setting of severe flu infection  -Continue Tamiflu 75 mg twice daily x 5 days      Discussed with MICU attending  Magan Alamo MD  Emergency Medicine, PGY3       Darrell Alamo MD      I have reviewed and evaluated the most recent data and results, personally examined the patient, and formulated the plan of care as presented above. This patient was critically ill and required continued critical care treatment. Teaching and any separately billable procedures are not included in the time calculation.     Briefly this is a 43-year-old male with pmhx of asthma who was admitted to the MICU with acute hypoxic respiratory failure 2/2 influenza pneumonia. Pt was found to have a lactic acidosis which is slowly resolving. Furthermore, there is concern for new reduced EF on TTE done yesterday in ED. Pt has been improving slowly and is now stable hemodynamically. Can Likely transfer to the SDU for further monitoring this AM.     Billing Provider Critical Care Time: 45 minutes    Adam Sanabria MD  Pulmonary & Critical Care Attending   [id number]     Please excuse any typographical or unwanted errors with in this documentation as voice recognition software was used to dictate this  note.

## 2024-01-31 NOTE — PROGRESS NOTES
Quincy Lee is a 43 y.o. male on day 1 of admission presenting with Flu.    Subjective   Patient is complaining of some SOB, but denied any CP, palpitation or any other symptoms., patient is sating well on 4-5 L , patient complaining of diarrhea, and calf pain.       Objective     Physical Exam  Constitutional:       General: He is not in acute distress.     Appearance: Normal appearance. He is not ill-appearing.   Eyes:      Extraocular Movements: Extraocular movements intact.   Cardiovascular:      Rate and Rhythm: Normal rate and regular rhythm.      Heart sounds: Normal heart sounds.   Pulmonary:      Breath sounds: Rales present.      Comments: On 5L of NC  Abdominal:      General: Abdomen is flat. There is no distension.      Palpations: Abdomen is soft.      Tenderness: There is no abdominal tenderness. There is no guarding or rebound.   Musculoskeletal:         General: No swelling or tenderness. Normal range of motion.   Skin:     General: Skin is warm.   Neurological:      General: No focal deficit present.      Mental Status: He is alert and oriented to person, place, and time.      Motor: No weakness.   Psychiatric:         Mood and Affect: Mood normal.         Behavior: Behavior normal.         Last Recorded Vitals  Blood pressure 118/77, pulse 100, temperature 36.5 °C (97.7 °F), temperature source Temporal, resp. rate 20, height 1.829 m (6'), weight 86.2 kg (190 lb), SpO2 97 %.  Intake/Output last 3 Shifts:  I/O last 3 completed shifts:  In: 1450 (16.8 mL/kg) [P.O.:700; I.V.:50 (0.6 mL/kg); IV Piggyback:700]  Out: - (0 mL/kg)   Weight: 86.2 kg     Relevant Results                             Assessment/Plan   Principal Problem:    Flu    Mr. Lee is a 43-year-old male with past medical history of asthma who was admitted to the MICU for influenza pneumonia and myocarditis with hemodynamic instability and evidence of cardiac dysfunction and hypoperfusion.Patient presented to our ER (1/30/2024) with 2  to 3-day history of a flulike illness .    Updates (1/31):  - Follow up MRSA nares,   - Follow up urine lytes for hyponatremia    - Follow-up on C. difficile PCR  - Follow-up on bilateral lower extremity duplex  - c/w Ceftriaxone/Azithromycin c/f atypical pneumonia (1/30- )  - c/w Tamiflu viral Pneumonia/myocarditis (1/30-)    #Flu A infection with viral vs atypical pneumonia   -Diffuse, patchy, bilateral infiltrates noted on CT PE likely representing a viral pneumonia versus less likely atypical bacterial pneumonia.  -Pt with 4-5L oxygen requirement, will wean as tolerated.   -Follow-up blood cultures drawn in the ED x 2  -Continue ceftriaxone and azithromycin  -Follow up MRSA nares due to his risk of superimposed MRSA pneumonia in the setting of severe flu infection  -Continue Tamiflu 75 mg twice daily x 5 days  -strep/legionella urine antigen negative       #h/o asthma with mild viral induced asthma exacerbation  -albuterol PRNq6 ordered for wheezing/dyspnea     #viral myocarditis with reduced EF  -EF 40-45% on TTE done today.   -Troponins elevated but now downtrending X2, BNP elevated, now down trending   -supportive care, standard HF treatment if patient's cardiac Function worsens or he begins to demonstrate signs/symptoms of decompensated heart failure  -Seen by cardiology in ED  -Started on maintenance IV fluids @75    #Hyponatremia   -122 on arrival, uptrended to 124, 122 (1/31)  -likely due to hypovolemia and insensible losses   -normal mentation, no indication for hypertonic saline   -will continue to trend  - RFPs Daily  -Follow-up on urine electrolytes    #SURENDRA  -Initial creatinine around 3.0 from a baseline of normal.  After fluid administration, creatinine down trended to the low twos.  -Likely prerenal in etiology given poor p.o. intake, insensible losses and diminished cardiac output.  -Strict I's and O's    #hypotension   -Likely hypovolemic as patient has been normotensive since fluid resuscitation  in the ED.  -POCUS on arrival to MICU demonstrated A completely collapsible, very thin IVC suggesting that patient still has room for fluids.  -500CC crystalloid bolus on arrival to MICU     #hypokalemia   -Potassium 2.9 on arrival to MICU, 3.4 (1/31)  -Again likely due to dehydration,insensible losses and poor p.o. intake  -Potassium replenishment ordered    F: PRN  E: PRN  GI: Not indicated   A: PIV  DVT ppx: Heparin   O2: 4-5 L via NC  Code Status: Full code           Emery Calvillo MD

## 2024-01-31 NOTE — SIGNIFICANT EVENT
I, personally, evaluated Quincy Lee prior to transfer to the floor, including reviewing all current laboratory and imaging studies. The patient remains appropriate for transfer to the floor. Bedside nurse and respiratory therapy are also in agreement of patient's readiness for the floor.     Brief summary:  [unfilled] is a @AGE@ [unfilled] who was admitted to the MICU on 1/30/2025 for influenza A, pneumonia and c/f viral mocarditis vs stress cardiomyopathy.   Updated focused Physical Exam:    Physical Exam:    Appearance: Alert, oriented , cooperative,  in no acute distress. Well nourished & well hydrated.    Skin: Intact,  dry skin, no lesions, rash, petechiae or purpura.     Eyes: PERRLA, EOMs intact,  No scleral injection. No scleral icterus.     ENT: Hearing grossly intact. External auditory canals patent. Nares patent, mucus membranes moist. Dentition without lesions.     Neck: Supple, without meningismus. Trachea at midline.     Pulmonary: Scattered rhonchorous breath sounds and inspiratory crackles to the bilateral lung fields.  No stridor.    Cardiac: Normal S1, S2 without murmur, rub, gallop or extrasystole. No JVD, Carotids without bruits.    Abdomen: Soft, nontender.  No palpable organomegaly.  No rebound or guarding.      Genitourinary: Exam deferred.    Musculoskeletal:  no edema, or deformity. Pulses full and equal. No cyanosis or clubbing.    Neurological:  Moving all 4 extremities equally, no focal findings identified    Psychiatric: Appropriate mood and affect.       Current Vital Signs:  @FLOWDATETIME(8,5,6,9,10::1:::0)@    Relevant updates since rounds:      Accepting team, who is the accepting team, received verbal sign out and the Provider Care team/Attending has been updated. Bedside nurse will now call accepting nurse for report and patient will be transferred to [facility] stepdown unit.    Magan Alamo MD  Emergency Medicine, PGY3

## 2024-01-31 NOTE — PROGRESS NOTES
SOCIAL WORK NOTE   SW met with with patient at bedside for assessment (please see flowsheets for further details). Patient lives at home independently with brother. He denied any DME or home care at this time. Patient noted brother as emergency contact. He does have adult children. .Social work to follow.  OSMAR Johnson, LISW-S ([id number])

## 2024-01-31 NOTE — SIGNIFICANT EVENT
Floor Readiness Note     I, personally, evaluated Quincy Lee prior to transfer to the floor, including reviewing all current laboratory and imaging studies. The patient remains appropriate for transfer to the floor. Bedside nurse and respiratory therapy are also in agreement of patient's readiness for the floor.     Brief summary:  Quincy Lee is a 43 y.o. male who was admitted to the MICU on 1/30 for Viral myocardials and viral/atypical pneumonia. They have been treated with Ceftriaxone/Azithromycin/Tamiflu.    Updated focused Physical Exam:  Appearance: Alert, oriented , cooperative,  in no acute distress. Well nourished & well hydrated.  Skin: Intact,  dry skin, no lesions, rash, petechiae or purpura.   Eyes: PERRLA, EOMs intact,  No scleral injection. No scleral icterus.   ENT: Hearing grossly intact. External auditory canals patent. Nares patent, mucus membranes moist. Dentition without lesions.   Neck: Supple, without meningismus. Trachea at midline.   Pulmonary: There are scattered rhonchorous breath sounds and inspiratory crackles heard in bilateral lung fields.    Cardiac: Tachycardic. Normal S1, S2 without murmur, rub, gallop or extrasystole. No JVD, Carotids without bruits.  Abdomen: Soft, nontender.  No palpable organomegaly.  No rebound or guarding.    Genitourinary: Exam deferred.  Musculoskeletal:  no edema, or deformity. Pulses full and equal. No cyanosis or clubbing.  Neurological:  Alert and oriented x 3 GCS is 15.Moving all 4 extremities equally, sensation to light touch intact distally in all 4 extremities.  No focal findings identified  Psychiatric: Appropriate mood and affect.     Current Vital Signs:  Heart Rate: (!) 129 (01/31/24 1000 : Sorin Marquez RN)  BP:  (132/72 sitting EOB) (01/31/24 0936 : Nancy Mazariegos, PT)  Temp: 36.7 °C (98.1 °F) (01/31/24 0829 : Clementine Long)  Resp:  (pre: 25 post: 22) (01/31/24 0936 : Nancy Mazariegos, PT)  SpO2: 90 % (01/31/24 1000 : Sorin Marquez RN)    Relevant  updates since rounds:  Follow up urine lytes for hyponatremia workup  Trend Trop, trending down 179->142    Accepting team, Tres, received verbal sign out and the Provider Care team/Attending has been updated. Bedside nurse will now call accepting nurse for report and patient will be transferred to [place] [id number].    Aaron Guerrero MD

## 2024-01-31 NOTE — PROGRESS NOTES
"Physical Therapy    Physical Therapy Evaluation    Patient Name: Quincy Lee  MRN: [id number]  Today's Date: 1/31/2024   Time Calculation  Start Time: 0936  Stop Time: 0950  Time Calculation (min): 14 min    Assessment/Plan   PT Assessment  PT Assessment Results: Decreased mobility  Rehab Prognosis: Good  End of Session Communication: Bedside nurse  End of Session Patient Position: Up in chair, Alarm off, not on at start of session  IP OR SWING BED PT PLAN  Inpatient or Swing Bed: Inpatient  PT Plan  Treatment/Interventions: Bed mobility, Transfer training, Gait training, Stair training, Balance training  PT Plan: Skilled PT  PT Frequency: 2 times per week  PT Discharge Recommendations: No PT needed after discharge  PT Recommended Transfer Status: Assist x1  PT - OK to Discharge: Yes (pending functional mobility progression)      Subjective   General Visit Information:  General  Reason for Referral: influenza A, pneumonia and c/f viral mocarditis vs stress cardiomyopathy. EKG performed showed sinus tachycardia without ischemic changes; CT PE (-)  Past Medical History Relevant to Rehab: asthma, reports x1 fall this past week just prior to admission  Family/Caregiver Present: No  Prior to Session Communication: Bedside nurse  Patient Position Received: Bed, 3 rail up, Alarm off, not on at start of session  General Comment: agreeable to participate with encouragement; appeared irritable at times.  Home Living:  Home Living  Home Living Comments: house with brother and sister; 1st floor set-up, \"few\" TATUM; tub shower  Prior Level of Function:  Prior Function Per Pt/Caregiver Report  Prior Function Comments: indep ambulation (household and community); (-) drive, reports family can A, reports currently being in lawsuit when therapist asked if patient works. indep ADLs/iADLs  Precautions:  Precautions  Hearing/Visual Limitations: denied wearing glasses  Medical Precautions: Fall precautions, Oxygen therapy device and " "L/min  Precautions Comment: droplet precautions  Vital Signs:  Vital Signs  Heart Rate:  (pre: 109 with activity, HR elevated to high 120s, post: 112)  Resp:  (pre: 25 post: 22)  SpO2:  (pre: 96% post: 94% on 4l NC)  BP:  (132/72 sitting EOB)  BP Location: Right arm  BP Method: Automatic    Objective   Pain:  Pain Assessment  Pain Assessment: 0-10  Pain Score: 0 - No pain  Cognition:  Cognition  Overall Cognitive Status: Within Functional Limits  Orientation Level:  (AxO x3)    General Assessments:       Sensation  Sensation Comment: denied numbness/tingling in BUEs/BLEs    Strength  Strength Comments: (B) UE: appears grossly WFL (BLE: appears grossly WFL, assessed via functional mobility tasks)       Static Sitting Balance  Static Sitting-Balance Support: No upper extremity supported  Static Sitting-Level of Assistance: Distant supervision  Dynamic Sitting Balance  Dynamic Sitting-Balance Support: No upper extremity supported  Dynamic Sitting-Comments: SBAx1    Static Standing Balance  Static Standing-Balance Support: No upper extremity supported  Static Standing-Level of Assistance: Close supervision  Static Standing-Comment/Number of Minutes: no acute LOB or postural sway, patient reports \"feeling stiff\"  Dynamic Standing Balance  Dynamic Standing-Balance Support: No upper extremity supported  Dynamic Standing-Comments: SBAx1 with ambulation within the room  Functional Assessments:  Bed Mobility  Bed Mobility: Yes  Bed Mobility 1  Bed Mobility 1: Supine to sitting  Level of Assistance 1: Distant supervision  Bed Mobility Comments 1: HOB flat, no use of bedrail for support    Transfers  Transfer: Yes  Transfer 1  Transfer From 1: Sit to, Stand to  Transfer to 1: Sit, Stand  Technique 1: Sit to stand, Stand to sit  Transfer Level of Assistance 1: Close supervision    Ambulation/Gait Training  Ambulation/Gait Training Performed: Yes  Ambulation/Gait Training 1  Surface 1: Level tile  Device 1: No device  Assistance " 1: Close supervision  Comments/Distance (ft) 1: no acute LOB; ambulation x15 ft within the room; patient cued to sit in chair that PT set-up at bedside so patient could eat breakfast. Patient denied SOB with OOB mobility. Further mobility limited OOB d/t patient wanting to eat breakfast.  Extremity/Trunk Assessments:  RUE   RUE :  (AROM WFL)  LUE   LUE:  (AROM WFL)  RLE   RLE :  (AROM WFL)  LLE   LLE :  (AROM WFL)  Outcome Measures:  [facility] Basic Mobility  Turning from your back to your side while in a flat bed without using bedrails: None  Moving from lying on your back to sitting on the side of a flat bed without using bedrails: A little  Moving to and from bed to chair (including a wheelchair): A little  Standing up from a chair using your arms (e.g. wheelchair or bedside chair): A little  To walk in hospital room: A little  Climbing 3-5 steps with railing: A little  Basic Mobility - Total Score: 19    FSS-ICU  Ambulation: Walks <50 feet with any assistance x1 or walks any distance with assistance x2 people  Rolling: Modified independence, requires use of assistive device  Sitting: Supervision or set-up only  Transfer Sit-to-Stand: Minimal assistance (performs 75% or more of task)  Transfer Supine-to-Sit: Minimal assistance (performs 75% or more of task)  Total Score: 20      E = Exercise and Early Mobility  Early Mobility/Exercise Safety Screen: Proceed with mobilization - No exclusion criteria met  Current Activity: Ambulating in room    Encounter Problems       Encounter Problems (Active)       PT Problem       Patient will complete bed mobility with independence        Start:  01/31/24    Expected End:  02/21/24            Patient will complete STS with independence using No Device without acute LOB         Start:  01/31/24    Expected End:  02/21/24            Patient will ambulate >/=200' with No Device with independence without acute LOB        Start:  01/31/24    Expected End:  02/21/24            Patient  will ascend/descend 4 steps with x1 handrail and independence without acute LOB.          Start:  01/31/24    Expected End:  02/21/24                   Education Documentation  Mobility Training, taught by Nancy Mazariegos PT at 1/31/2024 10:29 AM.  Learner: Patient  Readiness: Acceptance  Method: Explanation  Response: Needs Reinforcement  Comment: educated patient on importance of mobility throughout hospital stay to prevent deconditioning; up in chair with at least meals; to bathroom with RN assist, ambulate with staff assist.    Education Comments  No comments found.        Nancy Mazariegos, DARLEEN, DPT

## 2024-01-31 NOTE — SIGNIFICANT EVENT
ICU to [facility] Transfer Summary     I:  ICU Admission Reason & Brief ICU Course:    The patient is a 43-year-old male with past medical history of asthma who was admitted to the MICU for influenza pneumonia and myocarditis with hemodynamic instability and evidence of cardiac dysfunction and hypoperfusion.Patient presented to our ER (1/30/2024) with 2 to 3-day history of a flulike illness.        In the ED patient's vitals in triage T 36.8 °C (98.2 °F)    BP 87/58  RR 17  O2 98 %.     EKG performed showed sinus tachycardia without ischemic changes.  Initial VBG Ph7.29/PCO2 40 lactate 5.3.  Basic labs showed leukocytosis 17.9 with left shift, hyponatremia of 122, significant SURENDRA with a creatinine of 3.09 (up from normal baseline). patient found to have troponin elevation to 173->177->174->166->142.  BNP elevated at 236->113.  Patient found to be flu a positive and CT PE performed showed evidence of bilateral infiltrates consistent with a viral or atypical pneumonia.  Due to concern for viral myocarditis, bedside POCUS was performed and showed diminished systolic function with global hypokinesia (no associated pericardial effusion) and so formal echo was obtained.  Formal echo revealed mildly decreased left ventricular systolic function at 40 to 45% estimated EF.  There is global hypokinesis of left ventricle and mildly reduced right ventricular systolic function. Blood cultures were obtained and the patient was started on ceftriaxone and Rocephin. He was given a 1 L fluid bolus with improvement in his lactate and creatinine.  Patient was evaluated by the CICU who saw him at bedside advised MICU admission for myocarditis and electrolyte abnormalities. Patient did have a couple of desaturations to the low 90s/upper 80s and was placed on 3 L of oxygen.  He was also treated with DuoNebs as there is an element of asthma exacerbation at play with obvious wheezing on initial evaluation.     On arrival to MICU, Pt  afebrile, , /83, RR26, 97% on 3L. On 1/31 patient reported some SOB, denied any CP, palpitation or any other symptoms. Patient continue to be on 4-5 L via NC satting well, and continue to be tachycardic 110s, his trop/BNP is trending down, and SURENDRA is improving with IV fluid. Patient has hyponatremia most likely due to hypovolemia improving with IVF and hypokalemia 3.4 (1/31 Replated with 40 meq PO) Patient is medically stable to transfer to the floor.      C: Code Status/DPOA Info/Goals of Care/ACP Note    Full Code  DPOA/Contact Number: ...    U: Unprescribing & Pertinent High-Risk Medications    Changes to home meds:   None     Anticoagulation: Yes - [facility]QH    Antibiotics:   []  Ceftriaxone/Azithromycin c/f atypical pneumonia (1/30- )  []  Tamilflu viral Pneumonia/myocarditis (1/30-)    P: Pending Tests at the Time of Transfer   -Urine lytes    A: Active consultants, including Rehab:   []  Subspecialty Consultants:   [x]  PT  [x]  OT  []  SLP  []  Wound Care    U: Uncertainty Measure/Diagnostic Pause:    Working diagnosis at the time of transfer viral myocarditis/ viral pneumonia vs atypical pneumonia treated with  Ceftriaxone/Azithromycin/Tamilflu      Diagnosis Degree of Certainty: 1. High degree of certainty about the clinical diagnosis.     S: Summary of Major Problems and To-Dos:   #viral myocarditis with reduced EF   #atypical vs viral pneumonia     To-do list prior to transfer:  [] Follow up urine lytes for hyponatremia workup  [] Trend Trop, trending down 179->142  [] CT showing features of emphysema, patient needs follow up with pulm as outpatient.     E: Exam, including Lines/Drains/Airways & Data Review:   Appearance: Alert, oriented , cooperative,  in no acute distress. Well nourished & well hydrated.  Skin: Intact,  dry skin, no lesions, rash, petechiae or purpura.   Eyes: PERRLA, EOMs intact,  No scleral injection. No scleral icterus.   ENT: Hearing grossly intact. External auditory canals  patent. Nares patent, mucus membranes moist. Dentition without lesions.   Neck: Supple, without meningismus. Trachea at midline.   Pulmonary: There are scattered rhonchorous breath sounds and inspiratory crackles heard in bilateral lung fields.    Cardiac: Tachycardic. Normal S1, S2 without murmur, rub, gallop or extrasystole. No JVD, Carotids without bruits.  Abdomen: Soft, nontender.  No palpable organomegaly.  No rebound or guarding.    Genitourinary: Exam deferred.  Musculoskeletal:  no edema, or deformity. Pulses full and equal. No cyanosis or clubbing.  Neurological:  Alert and oriented x 3 GCS is 15.Moving all 4 extremities equally, sensation to light touch intact distally in all 4 extremities.  No focal findings identified  Psychiatric: Appropriate mood and affect.     Difficult airway? No  Lines/drains assessed for removal? Yes    Within 30 minutes of the patient physically leaving the floor, a Floor Readiness Note needs to be placed with updated vitals.

## 2024-01-31 NOTE — CARE PLAN
Fall prevention continued, bed wheels locked, side rails up x3, bed in lowest position, bed alarm on, room door open and lights on for ease of visibility.    Skin care plan continued, site care performed, minimal linens placed underneath and PT turned every two hours and PT checked for incontinence.        PT is in Droplet  Precaution. Personal protective equipment available outside of the pt room for application prior to entry. Isolation sign posted on door. Visitors have been educated and demonstrate understanding of isolation precautions.     Patient Infection Status       Infection Onset Added Last Indicated Last Indicated By Review Planned Expiration Resolved Resolved By    Influenza 01/30/24 01/30/24 01/30/24 Sars-CoV-2 and Influenza A/B PCR 02/07/24 02/06/24

## 2024-02-01 LAB
ALBUMIN SERPL BCP-MCNC: 3.5 G/DL (ref 3.4–5)
ALBUMIN SERPL BCP-MCNC: 3.5 G/DL (ref 3.4–5)
ALBUMIN SERPL BCP-MCNC: 3.6 G/DL (ref 3.4–5)
ANION GAP SERPL CALC-SCNC: 14 MMOL/L (ref 10–20)
ANION GAP SERPL CALC-SCNC: 15 MMOL/L (ref 10–20)
ANION GAP SERPL CALC-SCNC: 17 MMOL/L (ref 10–20)
BASOPHILS # BLD AUTO: 0.01 X10*3/UL (ref 0–0.1)
BASOPHILS NFR BLD AUTO: 0.1 %
BUN SERPL-MCNC: 33 MG/DL (ref 6–23)
BUN SERPL-MCNC: 45 MG/DL (ref 6–23)
BUN SERPL-MCNC: 53 MG/DL (ref 6–23)
C DIF TOX TCDA+TCDB STL QL NAA+PROBE: NOT DETECTED
CALCIUM SERPL-MCNC: 8.5 MG/DL (ref 8.6–10.6)
CALCIUM SERPL-MCNC: 8.7 MG/DL (ref 8.6–10.6)
CALCIUM SERPL-MCNC: 8.7 MG/DL (ref 8.6–10.6)
CARDIAC TROPONIN I PNL SERPL HS: 89 NG/L (ref 0–53)
CHLORIDE SERPL-SCNC: 94 MMOL/L (ref 98–107)
CHLORIDE SERPL-SCNC: 96 MMOL/L (ref 98–107)
CHLORIDE SERPL-SCNC: 97 MMOL/L (ref 98–107)
CO2 SERPL-SCNC: 16 MMOL/L (ref 21–32)
CO2 SERPL-SCNC: 16 MMOL/L (ref 21–32)
CO2 SERPL-SCNC: 20 MMOL/L (ref 21–32)
CREAT SERPL-MCNC: 0.89 MG/DL (ref 0.5–1.3)
CREAT SERPL-MCNC: 0.98 MG/DL (ref 0.5–1.3)
CREAT SERPL-MCNC: 1.14 MG/DL (ref 0.5–1.3)
CRP SERPL-MCNC: 13.17 MG/DL
EGFRCR SERPLBLD CKD-EPI 2021: 81 ML/MIN/1.73M*2
EGFRCR SERPLBLD CKD-EPI 2021: >90 ML/MIN/1.73M*2
EGFRCR SERPLBLD CKD-EPI 2021: >90 ML/MIN/1.73M*2
EOSINOPHIL # BLD AUTO: 0 X10*3/UL (ref 0–0.7)
EOSINOPHIL NFR BLD AUTO: 0 %
ERYTHROCYTE [DISTWIDTH] IN BLOOD BY AUTOMATED COUNT: 13.4 % (ref 11.5–14.5)
GLUCOSE SERPL-MCNC: 107 MG/DL (ref 74–99)
GLUCOSE SERPL-MCNC: 94 MG/DL (ref 74–99)
GLUCOSE SERPL-MCNC: 95 MG/DL (ref 74–99)
HCT VFR BLD AUTO: 38.8 % (ref 41–52)
HGB BLD-MCNC: 13.3 G/DL (ref 13.5–17.5)
IMM GRANULOCYTES # BLD AUTO: 0.02 X10*3/UL (ref 0–0.7)
IMM GRANULOCYTES NFR BLD AUTO: 0.2 % (ref 0–0.9)
LYMPHOCYTES # BLD AUTO: 1.46 X10*3/UL (ref 1.2–4.8)
LYMPHOCYTES NFR BLD AUTO: 14.7 %
MAGNESIUM SERPL-MCNC: 2.21 MG/DL (ref 1.6–2.4)
MCH RBC QN AUTO: 29.5 PG (ref 26–34)
MCHC RBC AUTO-ENTMCNC: 34.3 G/DL (ref 32–36)
MCV RBC AUTO: 86 FL (ref 80–100)
MONOCYTES # BLD AUTO: 0.49 X10*3/UL (ref 0.1–1)
MONOCYTES NFR BLD AUTO: 4.9 %
MRSA DNA SPEC QL NAA+PROBE: NOT DETECTED
NEUTROPHILS # BLD AUTO: 7.96 X10*3/UL (ref 1.2–7.7)
NEUTROPHILS NFR BLD AUTO: 80.1 %
NRBC BLD-RTO: 0.2 /100 WBCS (ref 0–0)
PHOSPHATE SERPL-MCNC: 2.8 MG/DL (ref 2.5–4.9)
PHOSPHATE SERPL-MCNC: 2.9 MG/DL (ref 2.5–4.9)
PHOSPHATE SERPL-MCNC: 3.3 MG/DL (ref 2.5–4.9)
PLATELET # BLD AUTO: 156 X10*3/UL (ref 150–450)
POTASSIUM SERPL-SCNC: 3.5 MMOL/L (ref 3.5–5.3)
POTASSIUM SERPL-SCNC: 3.5 MMOL/L (ref 3.5–5.3)
POTASSIUM SERPL-SCNC: 3.7 MMOL/L (ref 3.5–5.3)
RBC # BLD AUTO: 4.51 X10*6/UL (ref 4.5–5.9)
SODIUM SERPL-SCNC: 123 MMOL/L (ref 136–145)
SODIUM SERPL-SCNC: 124 MMOL/L (ref 136–145)
SODIUM SERPL-SCNC: 126 MMOL/L (ref 136–145)
STAPHYLOCOCCUS SPEC CULT: NORMAL
STAPHYLOCOCCUS SPEC CULT: NORMAL
WBC # BLD AUTO: 9.9 X10*3/UL (ref 4.4–11.3)

## 2024-02-01 PROCEDURE — 36415 COLL VENOUS BLD VENIPUNCTURE: CPT

## 2024-02-01 PROCEDURE — 80069 RENAL FUNCTION PANEL: CPT

## 2024-02-01 PROCEDURE — 85025 COMPLETE CBC W/AUTO DIFF WBC: CPT

## 2024-02-01 PROCEDURE — 2500000002 HC RX 250 W HCPCS SELF ADMINISTERED DRUGS (ALT 637 FOR MEDICARE OP, ALT 636 FOR OP/ED): Performed by: INTERNAL MEDICINE

## 2024-02-01 PROCEDURE — 86140 C-REACTIVE PROTEIN: CPT

## 2024-02-01 PROCEDURE — 2500000004 HC RX 250 GENERAL PHARMACY W/ HCPCS (ALT 636 FOR OP/ED)

## 2024-02-01 PROCEDURE — 94760 N-INVAS EAR/PLS OXIMETRY 1: CPT

## 2024-02-01 PROCEDURE — 1100000001 HC PRIVATE ROOM DAILY

## 2024-02-01 PROCEDURE — 83735 ASSAY OF MAGNESIUM: CPT

## 2024-02-01 PROCEDURE — 87641 MR-STAPH DNA AMP PROBE: CPT

## 2024-02-01 PROCEDURE — 87493 C DIFF AMPLIFIED PROBE: CPT

## 2024-02-01 PROCEDURE — 2500000001 HC RX 250 WO HCPCS SELF ADMINISTERED DRUGS (ALT 637 FOR MEDICARE OP)

## 2024-02-01 PROCEDURE — 94640 AIRWAY INHALATION TREATMENT: CPT

## 2024-02-01 PROCEDURE — 99222 1ST HOSP IP/OBS MODERATE 55: CPT | Performed by: STUDENT IN AN ORGANIZED HEALTH CARE EDUCATION/TRAINING PROGRAM

## 2024-02-01 PROCEDURE — 99233 SBSQ HOSP IP/OBS HIGH 50: CPT

## 2024-02-01 PROCEDURE — 84484 ASSAY OF TROPONIN QUANT: CPT

## 2024-02-01 RX ORDER — IPRATROPIUM BROMIDE AND ALBUTEROL SULFATE 2.5; .5 MG/3ML; MG/3ML
3 SOLUTION RESPIRATORY (INHALATION)
Status: DISCONTINUED | OUTPATIENT
Start: 2024-02-01 | End: 2024-02-02

## 2024-02-01 RX ORDER — SODIUM CHLORIDE 9 MG/ML
75 INJECTION, SOLUTION INTRAVENOUS CONTINUOUS
Status: DISCONTINUED | OUTPATIENT
Start: 2024-02-01 | End: 2024-02-05

## 2024-02-01 RX ADMIN — SODIUM CHLORIDE 75 ML/HR: 9 INJECTION, SOLUTION INTRAVENOUS at 09:00

## 2024-02-01 RX ADMIN — OSELTAMIVIR PHOSPHATE 75 MG: 75 CAPSULE ORAL at 09:00

## 2024-02-01 RX ADMIN — IPRATROPIUM BROMIDE AND ALBUTEROL SULFATE 3 ML: .5; 3 SOLUTION RESPIRATORY (INHALATION) at 08:29

## 2024-02-01 RX ADMIN — Medication 5 MG: at 22:37

## 2024-02-01 RX ADMIN — OSELTAMIVIR PHOSPHATE 75 MG: 75 CAPSULE ORAL at 21:15

## 2024-02-01 RX ADMIN — HEPARIN SODIUM 5000 UNITS: 5000 INJECTION INTRAVENOUS; SUBCUTANEOUS at 21:16

## 2024-02-01 RX ADMIN — HEPARIN SODIUM 5000 UNITS: 5000 INJECTION INTRAVENOUS; SUBCUTANEOUS at 05:12

## 2024-02-01 RX ADMIN — IPRATROPIUM BROMIDE AND ALBUTEROL SULFATE 3 ML: .5; 3 SOLUTION RESPIRATORY (INHALATION) at 20:50

## 2024-02-01 RX ADMIN — IPRATROPIUM BROMIDE AND ALBUTEROL SULFATE 3 ML: .5; 3 SOLUTION RESPIRATORY (INHALATION) at 13:55

## 2024-02-01 RX ADMIN — SODIUM CHLORIDE 75 ML/HR: 9 INJECTION, SOLUTION INTRAVENOUS at 22:39

## 2024-02-01 RX ADMIN — CEFTRIAXONE SODIUM 1 G: 1 INJECTION, SOLUTION INTRAVENOUS at 17:38

## 2024-02-01 ASSESSMENT — COGNITIVE AND FUNCTIONAL STATUS - GENERAL
MOBILITY SCORE: 24
DAILY ACTIVITIY SCORE: 24

## 2024-02-01 ASSESSMENT — PAIN SCALES - GENERAL
PAINLEVEL_OUTOF10: 0 - NO PAIN

## 2024-02-01 ASSESSMENT — PAIN - FUNCTIONAL ASSESSMENT
PAIN_FUNCTIONAL_ASSESSMENT: 0-10

## 2024-02-01 NOTE — CARE PLAN
Problem: Safety  Goal: Patient will be injury free during hospitalization  Outcome: Progressing   The patient's goals for the shift include sleep    The clinical goals for the shift include sodium level WNL.    Over the shift, the patient did not make progress toward the following goals.

## 2024-02-01 NOTE — DISCHARGE INSTRUCTIONS
Mr. Lee,     You were admitted to [facility] for the management of Influenza A (the flu) resulting in asthma exacerbation. You required ICU admission for respiratory support. You were treated initially with antibiotics due to concern for concurrent pneumonia and were treated with Tamiflu. You will need to see Pulmonology (lung doctors) outside the hospital for further management of your asthma.     During your hospitalization one of the markers in the blood that indicated heart injury was elevated. You were seen by Cardiology and had an echocardiogram. Your echocardiogram showed decreased heart function. You were started on metoprolol and will need to follow up with the heart doctors outside the hospital.    Lastly, you had several episodes of black stool and had a drop in your hemoglobin. Because of this you received 1 unit of blood transfusion and were seen by the Gastroenterologists (stomach doctors). You had an upper endoscopy that revealed an ulcer in your stomach. The stomach doctors recommend  taking your pantoprazole twice a day for 2 weeks and then switching back to once daily after. You will need to follow up with the stomach doctors outside the hospital.     Your Care Team    To Do:  - Follow up with Cardiology (heart doctor), PCP (Primary care), Pulmonology (lung doctors), and Gastroenterology (stomach doctors)     If you have not received a call to schedule and appointment within 2 days, please call [phone]. Please bring with you to the appointment a photo ID and insurance card. Please also bring a list of all of the medications that you are currently taking to this appointment as well so these can be reviewed.    Medication Changes:  - Pantoprazole twice a day for 2 weeks and then switching back to once daily after  - Start Metoprolol 12.5 mg daily   - Continue the rest of your home medications as you were prior to admission to hospital

## 2024-02-01 NOTE — CARE PLAN
Problem: Pain  Goal: My pain/discomfort is manageable  Outcome: Progressing   The patient's goals for the shift include sleep    The clinical goals for the shift include Patient will not show any signs of resp distress during my shift    Patient c/o ROSALES . No SOB at rest. Continued with moist cough with no production noted.

## 2024-02-01 NOTE — SIGNIFICANT EVENT
RADAR initiated Rapid Response page     02/01/24 0834   Onset Documentation   Rapid Response Initiated By Radar auto page   [place] [facility] [place] [phone]   Arrival Time 0844   Event End Time 0854   Level II Called No   Primary Reason for Call Radar auto page     Page received for RADAR score of 7 based on VS entered.  Upon arrival, RN at bedside.  No acute changes at present time.  No Rapid Response RN interventions required.  RN encouraged to page Rapid Response if further concern in patient condition arises.

## 2024-02-01 NOTE — PROGRESS NOTES
Quincy Lee is a 43 y.o. male on day 2 of admission presenting with Flu.    Subjective   Patient complains of some SOB, improving, but denied any CP, palpitation or any other symptoms. Patient is saturating well on 2 L.       Objective     Physical Exam  Constitutional:       General: He is not in acute distress.     Appearance: Normal appearance. He is not ill-appearing.   Eyes:      Extraocular Movements: Extraocular movements intact.   Cardiovascular:      Rate and Rhythm: Normal rate and regular rhythm.      Heart sounds: Normal heart sounds.   Pulmonary:      Breath sounds: Rales present.      Comments: On 5L of NC  Abdominal:      General: Abdomen is flat. There is no distension.      Palpations: Abdomen is soft.      Tenderness: There is no abdominal tenderness. There is no guarding or rebound.   Musculoskeletal:         General: No swelling or tenderness. Normal range of motion.   Skin:     General: Skin is warm.   Neurological:      General: No focal deficit present.      Mental Status: He is alert and oriented to person, place, and time.      Motor: No weakness.   Psychiatric:         Mood and Affect: Mood normal.         Behavior: Behavior normal.         Last Recorded Vitals  Blood pressure 100/62, pulse 98, temperature 36.7 °C (98.1 °F), temperature source Temporal, resp. rate 22, height 1.829 m (6'), weight 65.6 kg (144 lb 10 oz), SpO2 98 %.  Intake/Output last 3 Shifts:  I/O last 3 completed shifts:  In: 1550 (23.6 mL/kg) [P.O.:800; I.V.:50 (0.8 mL/kg); IV Piggyback:700]  Out: 600 (9.1 mL/kg) [Urine:600 (0.3 mL/kg/hr)]  Weight: 65.6 kg     Relevant Results                             Assessment/Plan   Principal Problem:    Flu    Mr. Lee is a 43-year-old male with past medical history of asthma who was admitted to the MICU for influenza pneumonia and myocarditis with hemodynamic instability and evidence of cardiac dysfunction and hypoperfusion.Patient presented to our ER (1/30/2024) with 2 to  3-day history of a flulike illness .    Updates (2/1):  - Urine PNA antigens negative, will discontinue azithromycin  - Will consult cardiology for suggestions on management regarding suspected viral myocarditis  - Follow up MRSA nares, C diff PCR, and urine lytes for hyponatremia  - Follow-up on bilateral lower extremity duplex  - c/w Ceftriaxone c/f atypical pneumonia (1/30- )  - c/w Tamiflu viral Pneumonia/myocarditis (1/30-)    #Flu A infection with viral vs atypical pneumonia   -Diffuse, patchy, bilateral infiltrates noted on CT PE likely representing a viral pneumonia versus less likely atypical bacterial pneumonia.  -Pt with 4-5L oxygen requirement, will wean as tolerated.   -Follow-up blood cultures drawn in the ED x 2  -Continue ceftriaxone and azithromycin  -Follow up MRSA nares due to his risk of superimposed MRSA pneumonia in the setting of severe flu infection  -Continue Tamiflu 75 mg twice daily x 5 days  -strep/legionella urine antigen negative     #h/o asthma with mild viral induced asthma exacerbation  -albuterol PRNq6 ordered for wheezing/dyspnea     #viral myocarditis with reduced EF  -EF 40-45% on TTE done today.   -Troponins elevated but now downtrending X2, BNP elevated, now down trending   -supportive care, standard HF treatment if patient's cardiac Function worsens or he begins to demonstrate signs/symptoms of decompensated heart failure  -Seen by cardiology in ED  -Started on maintenance IV fluids @75    #Hyponatremia   -122 on arrival, uptrended to 124, 122 (1/31)  -likely due to hypovolemia and insensible losses   -normal mentation, no indication for hypertonic saline   -will continue to trend  - RFPs Daily  -Follow-up on urine electrolytes    #SURENDRA  -Initial creatinine around 3.0 from a baseline of normal.  After fluid administration, creatinine down trended to the low twos.  -Likely prerenal in etiology given poor p.o. intake, insensible losses and diminished cardiac output.  -Strict I's  and O's    #hypotension   -Likely hypovolemic as patient has been normotensive since fluid resuscitation in the ED.  -POCUS on arrival to MICU demonstrated A completely collapsible, very thin IVC suggesting that patient still has room for fluids.  -500CC crystalloid bolus on arrival to MICU     #hypokalemia   -Potassium 2.9 on arrival to MICU, 3.4 (1/31)  -Again likely due to dehydration,insensible losses and poor p.o. intake  -Potassium replenishment ordered    F: PRN  E: PRN  GI: Not indicated   A: PIV  DVT ppx: Heparin   O2: 4-5 L via NC  Code Status: Full code           Enrique Mendes MD

## 2024-02-01 NOTE — CONSULTS
Inpatient consult to Cardiology  Consult performed by: Johnnie Sanchez MD  Consult ordered by: Zaki Lagunas MD        History Of Present Illness:    Pt is a 42 yo M w PMH of asthma and aortic regurgitation who was admitted to the MICU for influenza pneumonia and shock in the setting of flu. Cardiology c/s for new HFrEF and c/f myocarditis.    Pt was initially seen by cardiology in the ED to triage it pt should go to CICU. Per ED provider at the time, pts lactate was 5 and there was some concerning evidence on POCUS that his EF was severely decreased. Pt was very warm and dry on exam with no evidence of cardiogenic shock. A formal TTE was recommended which did show a mild decrease in EF relative to prior (40-45% relative to prior 50-55%). Given trop elevations to 170 and initial finding of reduced EF on POCUS, the picture was concerning in the ED for myocarditis. Per ED provider pt may have had hx of myocarditis however this is not visible in the chart and pt states that he has had severe viral illness in the past but did not think it caused heart issues. However, given pts lactate cleared with aggressive volume repletion and he did not have findings consistent with a cardiogenic component to his shock presentation, pt was declined admission to CICU. ED provider felt he was more appropriate for higher level of care given electrolyte derangements and so he was admitted to MICU for further management. Pt was managed for flu PNA and given additional fluids in the MICU and now transferred to the floor.    On the floor pt reports feeling much better. Denies CP and SOB. When asked about his prior heart issues he believes the last time he had PNA he was worked up for a heart issue that involved a leaking valve. In deed pt did have workup for aortic regurgitation in 2019 but did not have any interventions performed. On repeat TTE this admission AR is mild mod. Currently he feels no sx and states that prior to this  presentation he had no issues with CP. He would get SOB with exertion but after playing basketball for 10 mins. Currently denies CP and is breathing well. No palpitations.      Cardiac Testing:  TTE 1/30/2024   1. Left ventricular systolic function is mildly decreased with a 40-45% estimated ejection fraction.   2. There is global hypokinesis of the left ventricle with minor regional variations.   3. There is mildly reduced right ventricular systolic function.   4. Mild to moderate aortic valve regurgitation.   5. Normal aortic root.    JABARI 12/2019:   1. The left ventricular systolic function is low normal with a 55% estimated ejection fraction.   2. There appears to be doming and malcoaptation of the aortic valve leaflets.   3. There is moderate (possibly moderate to severe) aortic valve regurgitation.   4. No definite valvular vegetations were visualized.     Last Recorded Vitals:  Vitals:    02/01/24 0501 02/01/24 0632 02/01/24 0735 02/01/24 0915   BP: 116/64  100/62    BP Location:   Right arm    Patient Position:   Lying    Pulse: 102  98    Resp: 19  22    Temp: 36.5 °C (97.7 °F)  36.7 °C (98.1 °F)    TempSrc: Temporal  Temporal    SpO2: 98%  98% 97%   Weight:  65.6 kg (144 lb 10 oz)     Height:           Last Labs:  CBC - 2/1/2024:  6:20 AM  9.9 13.3 156    38.8      CMP - 2/1/2024:  6:20 AM  8.5 8.8 120 --- 0.3   2.9 3.5 22 88      PTT - 1/30/2024:  9:49 PM  1.1   12.3 26     Troponin I, High Sensitivity   Date/Time Value Ref Range Status   02/01/2024 12:15 AM 89 (H) 0 - 53 ng/L Final   01/31/2024 07:56  (HH) 0 - 53 ng/L Final   01/30/2024 09:49  (HH) 0 - 53 ng/L Final     Comment:     Previous result verified on 1/30/2024 1051 on specimen/case [id number] called with component [facility] for procedure Troponin I, High Sensitivity with value 173 ng/L.     BNP   Date/Time Value Ref Range Status   01/30/2024 09:49  (H) 0 - 99 pg/mL Final   01/30/2024 10:04  (H) 0 - 99 pg/mL Final     LDL  Calculated   Date/Time Value Ref Range Status   01/30/2024 09:49 PM 54 <=99 mg/dL Final     Comment:                                 Near   Borderline      AGE      Desirable  Optimal    High     High     Very High     0-19 Y     0 - 109     ---    110-129   >/= 130     ----    20-24 Y     0 - 119     ---    120-159   >/= 160     ----      >24 Y     0 -  99   100-129  130-159   160-189     >/=190       VLDL   Date/Time Value Ref Range Status   01/30/2024 09:49 PM 20 0 - 40 mg/dL Final      Last I/O:  I/O last 3 completed shifts:  In: 1550 (23.6 mL/kg) [P.O.:800; I.V.:50 (0.8 mL/kg); IV Piggyback:700]  Out: 600 (9.1 mL/kg) [Urine:600 (0.3 mL/kg/hr)]  Weight: 65.6 kg     Past Cardiology Tests (Last 3 Years):  EKG:  ECG 12 Lead 01/30/2024 (Preliminary)      ECG 12 lead 01/30/2024    Echo:  Transthoracic Echo (TTE) Complete 01/30/2024    Ejection Fractions:  EF   Date/Time Value Ref Range Status   01/30/2024 04:30 PM 43 %      Cath:  No results found for this or any previous visit from the past 1095 days.    Stress Test:  No results found for this or any previous visit from the past 1095 days.    Cardiac Imaging:  No results found for this or any previous visit from the past 1095 days.      Past Medical History:  He has no past medical history on file.    Past Surgical History:  He has no past surgical history on file.      Social History:  He reports that he has never smoked. He has never been exposed to tobacco smoke. He has never used smokeless tobacco. No history on file for alcohol use and drug use.    Family History:  No family history on file.     Allergies:  Patient has no known allergies.    Inpatient Medications:  Scheduled medications   Medication Dose Route Frequency    azithromycin  500 mg intravenous q24h    cefTRIAXone  1 g intravenous q24h    heparin (porcine)  5,000 Units subcutaneous q8h    ipratropium-albuteroL  3 mL nebulization q6h    melatonin  5 mg oral Nightly    oseltamivir  75 mg oral BID      PRN medications   Medication    acetaminophen    albuterol    HYDROmorphone     Continuous Medications   Medication Dose Last Rate    sodium chloride 0.9%  75 mL/hr 75 mL/hr (02/01/24 0900)     Outpatient Medications:  No current outpatient medications    Physical Exam:  GEN: NAD, pleasant  CV: RRR, S1/S2, no mrg, no JVD  PULM: Lungs CTAB anteriorly, no wrr, no increased wob on RA, laying flat without SOB  EXT: no LE edema      Assessment/Plan   Pt is a 42 yo M w PMH of asthma and aortic regurgitation who was admitted to the MICU for influenza pneumonia and shock in the setting of flu. Cardiology c/s for new HFrEF and c/f myocarditis.    Pts initial presentation is mostly consistent with severe influenza and shock d/t severe dehydration. POCUS appeared to have severely decreased EF however formal TTE showing EF 40-45% which is mild reduction from prior 50-55%. Pts AI has also improved since 2019 now mild-mod previously severe on JABARI. Trops were elevated to 170s however low and flat which more likely represent demand ischemia in the setting of dehydration vs less likely myocarditis. It is not unexpected in some pt to have a mild reduction in EF with critical illness though will confirm with further workup. Severe SURENDRA is now resolved s/p aggressive fluid repletion.    Recommendations:  -Repeat troponin is downtrending relative to prior in the setting of resolving SURENDRA, argues against active myocarditis. No signs on echo to suggest perimyocarditis save mildly reduced EF  -Continue supportive care for flu  -Get repeat limited TTE once pt out of flu precautions (vs day prior to discharge) to reevaluate EF, if EF is still <50%, please start low dose entresto 12/13 bid (given low normal Bps) and metop succ 12.5 daily for GDMT  -Cardiology f/u on discharge regardless  -Can consider stress cMRI, likely as an outpt, which would clarify if ischemia (unlikely) or myocarditis may be present    Staffed with attending   Stephane.    Thank you for the consult. Cardiology will sign off. Please reconsult as needed.     Peripheral IV 01/30/24 18 G Left Antecubital (Active)   Site Assessment Clean;Dry;Intact 02/01/24 0800   Dressing Status Clean;Dry;Occlusive 02/01/24 0800   Number of days: 2       Code Status:  Full Code    I spent 45 minutes in the professional and overall care of this patient.        Johnnie Sanchez MD

## 2024-02-02 ENCOUNTER — APPOINTMENT (OUTPATIENT)
Dept: VASCULAR MEDICINE | Facility: HOSPITAL | Age: 44
End: 2024-02-02
Payer: MEDICAID

## 2024-02-02 ENCOUNTER — APPOINTMENT (OUTPATIENT)
Dept: CARDIOLOGY | Facility: HOSPITAL | Age: 44
End: 2024-02-02
Payer: MEDICAID

## 2024-02-02 LAB
ALBUMIN SERPL BCP-MCNC: 3.2 G/DL (ref 3.4–5)
ANION GAP SERPL CALC-SCNC: 11 MMOL/L (ref 10–20)
BACTERIA SPEC RESP CULT: NORMAL
BASOPHILS # BLD AUTO: 0.01 X10*3/UL (ref 0–0.1)
BASOPHILS NFR BLD AUTO: 0.2 %
BUN SERPL-MCNC: 41 MG/DL (ref 6–23)
CALCIUM SERPL-MCNC: 8.4 MG/DL (ref 8.6–10.6)
CHLORIDE SERPL-SCNC: 101 MMOL/L (ref 98–107)
CO2 SERPL-SCNC: 20 MMOL/L (ref 21–32)
CREAT SERPL-MCNC: 0.8 MG/DL (ref 0.5–1.3)
EGFRCR SERPLBLD CKD-EPI 2021: >90 ML/MIN/1.73M*2
EJECTION FRACTION APICAL 4 CHAMBER: 44.1
EJECTION FRACTION: 40 %
EOSINOPHIL # BLD AUTO: 0 X10*3/UL (ref 0–0.7)
EOSINOPHIL NFR BLD AUTO: 0 %
ERYTHROCYTE [DISTWIDTH] IN BLOOD BY AUTOMATED COUNT: 13.8 % (ref 11.5–14.5)
GLUCOSE SERPL-MCNC: 97 MG/DL (ref 74–99)
GRAM STN SPEC: NORMAL
GRAM STN SPEC: NORMAL
HCT VFR BLD AUTO: 27.9 % (ref 41–52)
HGB BLD-MCNC: 9.7 G/DL (ref 13.5–17.5)
IMM GRANULOCYTES # BLD AUTO: 0.02 X10*3/UL (ref 0–0.7)
IMM GRANULOCYTES NFR BLD AUTO: 0.3 % (ref 0–0.9)
IRON SATN MFR SERPL: 13 % (ref 25–45)
IRON SERPL-MCNC: 38 UG/DL (ref 35–150)
LEFT ATRIUM VOLUME AREA LENGTH INDEX BSA: 26.1 ML/M2
LEFT VENTRICLE INTERNAL DIMENSION DIASTOLE: 6.2 CM (ref 3.5–6)
LEFT VENTRICULAR OUTFLOW TRACT DIAMETER: 2.1 CM
LYMPHOCYTES # BLD AUTO: 1.87 X10*3/UL (ref 1.2–4.8)
LYMPHOCYTES NFR BLD AUTO: 29.4 %
MAGNESIUM SERPL-MCNC: 1.99 MG/DL (ref 1.6–2.4)
MCH RBC QN AUTO: 30.4 PG (ref 26–34)
MCHC RBC AUTO-ENTMCNC: 34.8 G/DL (ref 32–36)
MCV RBC AUTO: 88 FL (ref 80–100)
MONOCYTES # BLD AUTO: 0.51 X10*3/UL (ref 0.1–1)
MONOCYTES NFR BLD AUTO: 8 %
NEUTROPHILS # BLD AUTO: 3.95 X10*3/UL (ref 1.2–7.7)
NEUTROPHILS NFR BLD AUTO: 62.1 %
NRBC BLD-RTO: 0.3 /100 WBCS (ref 0–0)
PHOSPHATE SERPL-MCNC: 2.6 MG/DL (ref 2.5–4.9)
PLATELET # BLD AUTO: 145 X10*3/UL (ref 150–450)
POTASSIUM SERPL-SCNC: 3.2 MMOL/L (ref 3.5–5.3)
RBC # BLD AUTO: 3.19 X10*6/UL (ref 4.5–5.9)
RBC MORPH BLD: NORMAL
RIGHT VENTRICLE FREE WALL PEAK S': 10.2 CM/S
SODIUM SERPL-SCNC: 129 MMOL/L (ref 136–145)
TIBC SERPL-MCNC: 283 UG/DL (ref 240–445)
TRICUSPID ANNULAR PLANE SYSTOLIC EXCURSION: 1.5 CM
UIBC SERPL-MCNC: 245 UG/DL (ref 110–370)
WBC # BLD AUTO: 6.4 X10*3/UL (ref 4.4–11.3)

## 2024-02-02 PROCEDURE — 2500000004 HC RX 250 GENERAL PHARMACY W/ HCPCS (ALT 636 FOR OP/ED)

## 2024-02-02 PROCEDURE — 36415 COLL VENOUS BLD VENIPUNCTURE: CPT

## 2024-02-02 PROCEDURE — 87040 BLOOD CULTURE FOR BACTERIA: CPT

## 2024-02-02 PROCEDURE — 99233 SBSQ HOSP IP/OBS HIGH 50: CPT

## 2024-02-02 PROCEDURE — 93321 DOPPLER ECHO F-UP/LMTD STD: CPT | Performed by: INTERNAL MEDICINE

## 2024-02-02 PROCEDURE — 1100000001 HC PRIVATE ROOM DAILY

## 2024-02-02 PROCEDURE — 93308 TTE F-UP OR LMTD: CPT | Performed by: INTERNAL MEDICINE

## 2024-02-02 PROCEDURE — 93970 EXTREMITY STUDY: CPT

## 2024-02-02 PROCEDURE — 93325 DOPPLER ECHO COLOR FLOW MAPG: CPT

## 2024-02-02 PROCEDURE — 2500000001 HC RX 250 WO HCPCS SELF ADMINISTERED DRUGS (ALT 637 FOR MEDICARE OP)

## 2024-02-02 PROCEDURE — 80069 RENAL FUNCTION PANEL: CPT

## 2024-02-02 PROCEDURE — 83540 ASSAY OF IRON: CPT

## 2024-02-02 PROCEDURE — 93970 EXTREMITY STUDY: CPT | Performed by: INTERNAL MEDICINE

## 2024-02-02 PROCEDURE — 83735 ASSAY OF MAGNESIUM: CPT

## 2024-02-02 PROCEDURE — 93325 DOPPLER ECHO COLOR FLOW MAPG: CPT | Performed by: INTERNAL MEDICINE

## 2024-02-02 PROCEDURE — 85025 COMPLETE CBC W/AUTO DIFF WBC: CPT

## 2024-02-02 RX ORDER — IPRATROPIUM BROMIDE AND ALBUTEROL SULFATE 2.5; .5 MG/3ML; MG/3ML
3 SOLUTION RESPIRATORY (INHALATION) EVERY 4 HOURS PRN
Status: DISCONTINUED | OUTPATIENT
Start: 2024-02-02 | End: 2024-02-06 | Stop reason: HOSPADM

## 2024-02-02 RX ORDER — METOPROLOL SUCCINATE 25 MG/1
12.5 TABLET, EXTENDED RELEASE ORAL DAILY
Status: DISCONTINUED | OUTPATIENT
Start: 2024-02-02 | End: 2024-02-06 | Stop reason: HOSPADM

## 2024-02-02 RX ORDER — POTASSIUM CHLORIDE 1.5 G/1.58G
40 POWDER, FOR SOLUTION ORAL ONCE
Status: COMPLETED | OUTPATIENT
Start: 2024-02-02 | End: 2024-02-02

## 2024-02-02 RX ADMIN — HEPARIN SODIUM 5000 UNITS: 5000 INJECTION INTRAVENOUS; SUBCUTANEOUS at 20:58

## 2024-02-02 RX ADMIN — Medication 5 MG: at 20:58

## 2024-02-02 RX ADMIN — HEPARIN SODIUM 5000 UNITS: 5000 INJECTION INTRAVENOUS; SUBCUTANEOUS at 05:43

## 2024-02-02 RX ADMIN — HEPARIN SODIUM 5000 UNITS: 5000 INJECTION INTRAVENOUS; SUBCUTANEOUS at 12:46

## 2024-02-02 RX ADMIN — OSELTAMIVIR PHOSPHATE 75 MG: 75 CAPSULE ORAL at 08:29

## 2024-02-02 RX ADMIN — OSELTAMIVIR PHOSPHATE 75 MG: 75 CAPSULE ORAL at 20:58

## 2024-02-02 RX ADMIN — METOPROLOL SUCCINATE 12.5 MG: 25 TABLET, EXTENDED RELEASE ORAL at 12:46

## 2024-02-02 RX ADMIN — SODIUM CHLORIDE 500 ML: 9 INJECTION, SOLUTION INTRAVENOUS at 16:40

## 2024-02-02 RX ADMIN — SODIUM CHLORIDE 75 ML/HR: 9 INJECTION, SOLUTION INTRAVENOUS at 21:02

## 2024-02-02 RX ADMIN — POTASSIUM CHLORIDE 40 MEQ: 1.5 POWDER, FOR SOLUTION ORAL at 13:12

## 2024-02-02 ASSESSMENT — COGNITIVE AND FUNCTIONAL STATUS - GENERAL
MOBILITY SCORE: 24
DAILY ACTIVITIY SCORE: 24

## 2024-02-02 ASSESSMENT — PAIN - FUNCTIONAL ASSESSMENT: PAIN_FUNCTIONAL_ASSESSMENT: 0-10

## 2024-02-02 ASSESSMENT — PAIN SCALES - GENERAL: PAINLEVEL_OUTOF10: 0 - NO PAIN

## 2024-02-02 NOTE — PROGRESS NOTES
Quincy Lee is a 43 y.o. male on day 3 of admission presenting with Flu.    Subjective   Patient denied SOB, improving, but denied any CP, palpitation or any other symptoms. Patient is saturating well on room air.       Objective   Physical Exam  Constitutional:       General: He is not in acute distress.     Appearance: Normal appearance. He is not ill-appearing.   Eyes:      Extraocular Movements: Extraocular movements intact.   Cardiovascular:      Rate and Rhythm: Normal rate and regular rhythm.      Heart sounds: Normal heart sounds.   Pulmonary:      Breath sounds: Rales present.      Comments: On room air  Abdominal:      General: Abdomen is flat. There is no distension.      Palpations: Abdomen is soft.      Tenderness: There is no abdominal tenderness. There is no guarding or rebound.   Musculoskeletal:         General: No swelling or tenderness. Normal range of motion.   Skin:     General: Skin is warm.   Neurological:      General: No focal deficit present.      Mental Status: He is alert and oriented to person, place, and time.      Motor: No weakness.   Psychiatric:         Mood and Affect: Mood normal.         Behavior: Behavior normal.       Last Recorded Vitals  Blood pressure 103/71, pulse 95, temperature 36.4 °C (97.5 °F), temperature source Temporal, resp. rate 17, height 1.829 m (6'), weight 65.2 kg (143 lb 11.8 oz), SpO2 95 %.  Intake/Output last 3 Shifts:  I/O last 3 completed shifts:  In: 1181.3 (18.1 mL/kg) [P.O.:360; I.V.:821.3 (12.6 mL/kg)]  Out: 1825 (28 mL/kg) [Urine:1825 (0.8 mL/kg/hr)]  Weight: 65.2 kg     Relevant Results                             Assessment/Plan   Principal Problem:    Flu    Mr. Lee is a 43-year-old male with past medical history of asthma who was admitted to the MICU for influenza pneumonia and myocarditis with hemodynamic instability and evidence of cardiac dysfunction and hypoperfusion.Patient presented to our ER (1/30/2024) with 2 to 3-day history of a  flulike illness .    Updates (2/2):    - Repeat Blood Cultures 2/2 was ordered due to likely contamination.   - Echo that showed a 40% estimated ejection fraction., was started on Metoprolol 12.5, Per cardiology , plan to start low dose entresto 12/13 bid   - Follow-up on bilateral lower extremity duplex  - c/w Ceftriaxone c/f atypical pneumonia (1/30- )  - c/w Tamiflu viral Pneumonia/myocarditis (1/30-)    #Flu A infection with viral vs atypical pneumonia   -Diffuse, patchy, bilateral infiltrates noted on CT PE likely representing a viral pneumonia versus less likely atypical bacterial pneumonia.  -Pt with 4-5L oxygen requirement, will wean as tolerated.   -Follow-up blood cultures drawn in the ED x 2  -Continue ceftriaxone and azithromycin  -Follow up MRSA nares due to his risk of superimposed MRSA pneumonia in the setting of severe flu infection  -Continue Tamiflu 75 mg twice daily x 5 days  -strep/legionella urine antigen negative     #h/o asthma with mild viral induced asthma exacerbation  -albuterol PRNq6 ordered for wheezing/dyspnea     #viral myocarditis with reduced EF  -EF 40-45% on TTE done today.   -Troponins elevated but now downtrending X2, BNP elevated, now down trending   -supportive care, standard HF treatment if patient's cardiac Function worsens or he begins to demonstrate signs/symptoms of decompensated heart failure  -Seen by cardiology in ED  -Started on maintenance IV fluids @75  - Echo that showed a 40% estimated ejection fraction., was started on Metoprolol 12.5, Per cardiology , plan to start low dose entresto 12/13 bid     Cardiology Recs:   -Repeat troponin is downtrending relative to prior in the setting of resolving SURENDRA, argues against active myocarditis. No signs on echo to suggest perimyocarditis save mildly reduced EF  -Continue supportive care for flu  -Get repeat limited TTE once pt out of flu precautions (vs day prior to discharge) to reevaluate EF, if EF is still <50%, please  start low dose entresto 12/13 bid (given low normal Bps) and metop succ 12.5 daily for GDMT  -Cardiology f/u on discharge regardless  -Can consider stress cMRI, likely as an outpt, which would clarify if ischemia (unlikely) or myocarditis may be present    #Hyponatremia   -122 on arrival, uptrended to 124, 122 ,123,126  -likely due to hypovolemia and insensible losses   -normal mentation, no indication for hypertonic saline   -will continue to trend  - RFPs Daily      #SURENDRA  -Initial creatinine around 3.0 from a baseline of normal.  After fluid administration, creatinine down trended to the low twos.  -Likely prerenal in etiology given poor p.o. intake, insensible losses and diminished cardiac output.  -Strict I's and O's    #hypotension   -Likely hypovolemic as patient has been normotensive since fluid resuscitation in the ED.  -POCUS on arrival to MICU demonstrated A completely collapsible, very thin IVC suggesting that patient still has room for fluids.  -500CC crystalloid bolus on arrival to MICU     #hypokalemia   -Potassium 2.9 on arrival to MICU, 3.4 (1/31)  -Again likely due to dehydration,insensible losses and poor p.o. intake  -Potassium replenishment ordered    F: PRN  E: PRN  GI: Not indicated   A: PIV  DVT ppx: Heparin   O2: room air  Code Status: Full code           Eemry Calvillo MD

## 2024-02-02 NOTE — CARE PLAN
Problem: Safety  Goal: Patient will be injury free during hospitalization  Outcome: Progressing     Problem: Safety  Goal: I will remain free of falls  Outcome: Progressing     Problem: Safety  Goal: Patient will be injury free during hospitalization  Outcome: Progressing   The patient's goals for the shift include sleep    The clinical goals for the shift include Patient will not show any signs of resp distress during my shift    Over the shift, the patient did not make progress toward the following goals. Re-sent 2 sets of blood cultures this shift.

## 2024-02-02 NOTE — CARE PLAN
Problem: Pain  Goal: My pain/discomfort is manageable  Outcome: Progressing   The patient's goals for the shift include sleep    The clinical goals for the shift include Patient will not show any signs of resp. distress during my shift    Patient denies SOB .Continued with congested cough. No resp. Distress noted

## 2024-02-03 ENCOUNTER — PHARMACY VISIT (OUTPATIENT)
Dept: PHARMACY | Facility: CLINIC | Age: 44
End: 2024-02-03
Payer: MEDICARE

## 2024-02-03 PROBLEM — G47.00 ACUTE INSOMNIA: Status: ACTIVE | Noted: 2024-02-03

## 2024-02-03 PROBLEM — K92.2 GI BLEED: Status: ACTIVE | Noted: 2024-02-03

## 2024-02-03 LAB
ABO GROUP (TYPE) IN BLOOD: NORMAL
ABO GROUP (TYPE) IN BLOOD: NORMAL
ALBUMIN SERPL BCP-MCNC: 2.8 G/DL (ref 3.4–5)
ANION GAP SERPL CALC-SCNC: 9 MMOL/L (ref 10–20)
ANTIBODY SCREEN: NORMAL
BASOPHILS # BLD MANUAL: 0 X10*3/UL (ref 0–0.1)
BASOPHILS NFR BLD MANUAL: 0 %
BLOOD EXPIRATION DATE: NORMAL
BUN SERPL-MCNC: 28 MG/DL (ref 6–23)
CALCIUM SERPL-MCNC: 7.9 MG/DL (ref 8.6–10.6)
CHLORIDE SERPL-SCNC: 105 MMOL/L (ref 98–107)
CO2 SERPL-SCNC: 22 MMOL/L (ref 21–32)
CREAT SERPL-MCNC: 0.65 MG/DL (ref 0.5–1.3)
DISPENSE STATUS: NORMAL
EGFRCR SERPLBLD CKD-EPI 2021: >90 ML/MIN/1.73M*2
EOSINOPHIL # BLD MANUAL: 0 X10*3/UL (ref 0–0.7)
EOSINOPHIL NFR BLD MANUAL: 0 %
ERYTHROCYTE [DISTWIDTH] IN BLOOD BY AUTOMATED COUNT: 14.1 % (ref 11.5–14.5)
GLUCOSE SERPL-MCNC: 90 MG/DL (ref 74–99)
HCT VFR BLD AUTO: 20.4 % (ref 41–52)
HGB BLD-MCNC: 6.9 G/DL (ref 13.5–17.5)
IMM GRANULOCYTES # BLD AUTO: 0.02 X10*3/UL (ref 0–0.7)
IMM GRANULOCYTES NFR BLD AUTO: 0.4 % (ref 0–0.9)
LYMPHOCYTES # BLD MANUAL: 1.64 X10*3/UL (ref 1.2–4.8)
LYMPHOCYTES NFR BLD MANUAL: 31.6 %
MAGNESIUM SERPL-MCNC: 1.67 MG/DL (ref 1.6–2.4)
MCH RBC QN AUTO: 29.9 PG (ref 26–34)
MCHC RBC AUTO-ENTMCNC: 33.8 G/DL (ref 32–36)
MCV RBC AUTO: 88 FL (ref 80–100)
MONOCYTES # BLD MANUAL: 0.27 X10*3/UL (ref 0.1–1)
MONOCYTES NFR BLD MANUAL: 5.2 %
MYELOCYTES # BLD MANUAL: 0.05 X10*3/UL
MYELOCYTES NFR BLD MANUAL: 0.9 %
NEUTROPHILS # BLD MANUAL: 2.97 X10*3/UL (ref 1.2–7.7)
NEUTS BAND # BLD MANUAL: 0.09 X10*3/UL (ref 0–0.7)
NEUTS BAND NFR BLD MANUAL: 1.7 %
NEUTS SEG # BLD MANUAL: 2.88 X10*3/UL (ref 1.2–7)
NEUTS SEG NFR BLD MANUAL: 55.3 %
NRBC BLD-RTO: 0.4 /100 WBCS (ref 0–0)
PHOSPHATE SERPL-MCNC: 2.2 MG/DL (ref 2.5–4.9)
PLATELET # BLD AUTO: 150 X10*3/UL (ref 150–450)
POTASSIUM SERPL-SCNC: 3.4 MMOL/L (ref 3.5–5.3)
PRODUCT BLOOD TYPE: 6200
PRODUCT CODE: NORMAL
RBC # BLD AUTO: 2.31 X10*6/UL (ref 4.5–5.9)
RBC MORPH BLD: NORMAL
RH FACTOR (ANTIGEN D): NORMAL
RH FACTOR (ANTIGEN D): NORMAL
SODIUM SERPL-SCNC: 133 MMOL/L (ref 136–145)
TARGETS BLD QL SMEAR: NORMAL
TOTAL CELLS COUNTED BLD: 114
UNIT ABO: NORMAL
UNIT NUMBER: NORMAL
UNIT RH: NORMAL
UNIT VOLUME: 350
VARIANT LYMPHS # BLD MANUAL: 0.28 X10*3/UL (ref 0–0.5)
VARIANT LYMPHS NFR BLD: 5.3 %
WBC # BLD AUTO: 5.2 X10*3/UL (ref 4.4–11.3)
XM INTEP: NORMAL

## 2024-02-03 PROCEDURE — 99233 SBSQ HOSP IP/OBS HIGH 50: CPT

## 2024-02-03 PROCEDURE — 86920 COMPATIBILITY TEST SPIN: CPT

## 2024-02-03 PROCEDURE — 2500000001 HC RX 250 WO HCPCS SELF ADMINISTERED DRUGS (ALT 637 FOR MEDICARE OP)

## 2024-02-03 PROCEDURE — 36430 TRANSFUSION BLD/BLD COMPNT: CPT

## 2024-02-03 PROCEDURE — 80069 RENAL FUNCTION PANEL: CPT

## 2024-02-03 PROCEDURE — 1100000001 HC PRIVATE ROOM DAILY

## 2024-02-03 PROCEDURE — 36415 COLL VENOUS BLD VENIPUNCTURE: CPT

## 2024-02-03 PROCEDURE — 2500000004 HC RX 250 GENERAL PHARMACY W/ HCPCS (ALT 636 FOR OP/ED)

## 2024-02-03 PROCEDURE — RXMED WILLOW AMBULATORY MEDICATION CHARGE

## 2024-02-03 PROCEDURE — 85007 BL SMEAR W/DIFF WBC COUNT: CPT

## 2024-02-03 PROCEDURE — C9113 INJ PANTOPRAZOLE SODIUM, VIA: HCPCS

## 2024-02-03 PROCEDURE — 86901 BLOOD TYPING SEROLOGIC RH(D): CPT

## 2024-02-03 PROCEDURE — 83735 ASSAY OF MAGNESIUM: CPT

## 2024-02-03 PROCEDURE — P9016 RBC LEUKOCYTES REDUCED: HCPCS

## 2024-02-03 PROCEDURE — 85027 COMPLETE CBC AUTOMATED: CPT

## 2024-02-03 RX ORDER — PANTOPRAZOLE SODIUM 40 MG/10ML
40 INJECTION, POWDER, LYOPHILIZED, FOR SOLUTION INTRAVENOUS 2 TIMES DAILY
Status: DISCONTINUED | OUTPATIENT
Start: 2024-02-03 | End: 2024-02-05

## 2024-02-03 RX ORDER — OSELTAMIVIR PHOSPHATE 75 MG/1
75 CAPSULE ORAL 2 TIMES DAILY
Qty: 3 CAPSULE | Refills: 0 | Status: SHIPPED | OUTPATIENT
Start: 2024-02-03 | End: 2024-02-06 | Stop reason: HOSPADM

## 2024-02-03 RX ORDER — POTASSIUM CHLORIDE 1.5 G/1.58G
40 POWDER, FOR SOLUTION ORAL ONCE
Status: COMPLETED | OUTPATIENT
Start: 2024-02-03 | End: 2024-02-03

## 2024-02-03 RX ORDER — ACETAMINOPHEN 500 MG
5 TABLET ORAL NIGHTLY PRN
Qty: 30 TABLET | Refills: 0 | Status: SHIPPED | OUTPATIENT
Start: 2024-02-03

## 2024-02-03 RX ORDER — PANTOPRAZOLE SODIUM 40 MG/1
40 TABLET, DELAYED RELEASE ORAL 2 TIMES DAILY
Qty: 120 TABLET | Refills: 0 | Status: SHIPPED | OUTPATIENT
Start: 2024-02-03 | End: 2024-02-06 | Stop reason: SDUPTHER

## 2024-02-03 RX ORDER — PANTOPRAZOLE SODIUM 40 MG/10ML
40 INJECTION, POWDER, LYOPHILIZED, FOR SOLUTION INTRAVENOUS 2 TIMES DAILY
Status: DISCONTINUED | OUTPATIENT
Start: 2024-02-04 | End: 2024-02-03

## 2024-02-03 RX ORDER — PANTOPRAZOLE SODIUM 40 MG/1
80 TABLET, DELAYED RELEASE ORAL ONCE
Status: COMPLETED | OUTPATIENT
Start: 2024-02-03 | End: 2024-02-03

## 2024-02-03 RX ADMIN — SODIUM CHLORIDE, POTASSIUM CHLORIDE, SODIUM LACTATE AND CALCIUM CHLORIDE 250 ML: 600; 310; 30; 20 INJECTION, SOLUTION INTRAVENOUS at 07:37

## 2024-02-03 RX ADMIN — HEPARIN SODIUM 5000 UNITS: 5000 INJECTION INTRAVENOUS; SUBCUTANEOUS at 12:21

## 2024-02-03 RX ADMIN — Medication 5 MG: at 22:12

## 2024-02-03 RX ADMIN — PANTOPRAZOLE SODIUM 40 MG: 40 INJECTION, POWDER, FOR SOLUTION INTRAVENOUS at 21:19

## 2024-02-03 RX ADMIN — POTASSIUM CHLORIDE 40 MEQ: 1.5 POWDER, FOR SOLUTION ORAL at 10:20

## 2024-02-03 RX ADMIN — OSELTAMIVIR PHOSPHATE 75 MG: 75 CAPSULE ORAL at 21:15

## 2024-02-03 RX ADMIN — OSELTAMIVIR PHOSPHATE 75 MG: 75 CAPSULE ORAL at 08:10

## 2024-02-03 RX ADMIN — HEPARIN SODIUM 5000 UNITS: 5000 INJECTION INTRAVENOUS; SUBCUTANEOUS at 04:39

## 2024-02-03 RX ADMIN — METOPROLOL SUCCINATE 12.5 MG: 25 TABLET, EXTENDED RELEASE ORAL at 08:10

## 2024-02-03 RX ADMIN — PANTOPRAZOLE SODIUM 80 MG: 40 TABLET, DELAYED RELEASE ORAL at 12:20

## 2024-02-03 ASSESSMENT — COGNITIVE AND FUNCTIONAL STATUS - GENERAL
DAILY ACTIVITIY SCORE: 24
MOBILITY SCORE: 24
DAILY ACTIVITIY SCORE: 24
MOBILITY SCORE: 24

## 2024-02-03 ASSESSMENT — PAIN - FUNCTIONAL ASSESSMENT: PAIN_FUNCTIONAL_ASSESSMENT: 0-10

## 2024-02-03 ASSESSMENT — PAIN SCALES - GENERAL
PAINLEVEL_OUTOF10: 0 - NO PAIN
PAINLEVEL_OUTOF10: 0 - NO PAIN

## 2024-02-03 NOTE — SIGNIFICANT EVENT
The patient is clinically not intoxicated, free from distracting pain, appears to have intact insight, judgment and reason and in my medical opinion has the capacity to make decisions. The patient is also not under any duress to leave the hospital. In this scenario, it would be battery to subject a patient to treatment against his will.     I have voiced my concerns for the patient's health given that a full evaluation and treatment had not occurred for his black stools and drop in his hemoglobin. I have discussed the need for continued evaluation to determine the source of the bleed. Risks including but not limited to death (e.g due to exsanguination), permanent disability, prolonged hospitalization, prolonged illness, were discussed.    Because I have been unable to convince the patient to stay, I answered all of their questions about their condition and asked them to return to the ED as soon as possible to complete their evaluation, especially if their symptoms worsen or do not improve (including dizziness, blurry vision, not feeling well, falls).     I emphasized that leaving against medical advice does not preclude returning here for further evaluation. I asked the patient to return if they change their mind about the further evaluation and treatment. I strongly encouraged the patient to return to this Emergency Department or any Emergency Department at any time, particularly with worsening symptoms.     Reason for leaving AMA: court hearing over a job dispute on Monday. Patient was offered a phone call and a letter to provide to court to delay hearing, he refused.     Of note, patient with prior multiple discharges AMA       Plan:   -outpatient GI referral   -protonix 80mg once, here, then discharge on 40 mg BID (instructed him to take on empty stomach and to wait for 30 min at least before eating anything)   -1 pRBC unit transfusion  -return precautions as above       ADDENDUM 2/4/2024 6:25AM   -Patient ended  up staying, will likely consult GI today if amenable to staying further    ambulatory

## 2024-02-03 NOTE — PROGRESS NOTES
Quincy Lee is a 43 y.o. male on day 4 of admission presenting with Flu.    Subjective   Patient denied SOB, improving, but denied any CP, palpitation or any other symptoms. Patient is saturating well on room air.       Objective   Physical Exam  Constitutional:       General: He is not in acute distress.     Appearance: Normal appearance. He is not ill-appearing.   Eyes:      Extraocular Movements: Extraocular movements intact.   Cardiovascular:      Rate and Rhythm: Normal rate and regular rhythm.      Heart sounds: Normal heart sounds.   Pulmonary:      Breath sounds: Rales present.      Comments: On room air  Abdominal:      General: Abdomen is flat. There is no distension.      Palpations: Abdomen is soft.      Tenderness: There is no abdominal tenderness. There is no guarding or rebound.   Musculoskeletal:         General: No swelling or tenderness. Normal range of motion.   Skin:     General: Skin is warm.   Neurological:      General: No focal deficit present.      Mental Status: He is alert and oriented to person, place, and time.      Motor: No weakness.   Psychiatric:         Mood and Affect: Mood normal.         Behavior: Behavior normal.       Last Recorded Vitals  Blood pressure 100/63, pulse 109, temperature 36.9 °C (98.4 °F), temperature source Temporal, resp. rate 21, height 1.829 m (6'), weight 66.2 kg (145 lb 15.1 oz), SpO2 98 %.  Intake/Output last 3 Shifts:  I/O last 3 completed shifts:  In: 600 (9.1 mL/kg) [P.O.:600]  Out: 2650 (40 mL/kg) [Urine:2650 (1.1 mL/kg/hr)]  Weight: 66.2 kg     Relevant Results                             Assessment/Plan   Principal Problem:    Flu  Active Problems:    Acute insomnia    GI bleed    Mr. Lee is a 43-year-old male with past medical history of asthma who was admitted to the MICU for influenza pneumonia and myocarditis with hemodynamic instability and evidence of cardiac dysfunction and hypoperfusion.Patient presented to our ER (1/30/2024) with 2 to  3-day history of a flulike illness .    Updates (2/3):    - Patient had soft blood pressure this morning with a Hgb drop to 6.9 plan to give a 1U of PRBC.   - Repeat Blood Cultures 2/2 was ordered due to likely contamination.   - Echo that showed a 40% estimated ejection fraction., was started on Metoprolol 12.5, Per cardiology , plan to start low dose entresto 12/13 bid   - Follow-up on bilateral lower extremity duplex  - c/w Ceftriaxone c/f atypical pneumonia (1/30- )  - c/w Tamiflu viral Pneumonia/myocarditis (1/30-)    #Flu A infection with viral vs atypical pneumonia   -Diffuse, patchy, bilateral infiltrates noted on CT PE likely representing a viral pneumonia versus less likely atypical bacterial pneumonia.  -Pt with 4-5L oxygen requirement, will wean as tolerated.   -Follow-up blood cultures drawn in the ED x 2  -Continue ceftriaxone and azithromycin  -Follow up MRSA nares due to his risk of superimposed MRSA pneumonia in the setting of severe flu infection  -Continue Tamiflu 75 mg twice daily x 5 days  -strep/legionella urine antigen negative     #h/o asthma with mild viral induced asthma exacerbation  -albuterol PRNq6 ordered for wheezing/dyspnea     #viral myocarditis with reduced EF  -EF 40-45% on TTE done today.   -Troponins elevated but now downtrending X2, BNP elevated, now down trending   -supportive care, standard HF treatment if patient's cardiac Function worsens or he begins to demonstrate signs/symptoms of decompensated heart failure  -Seen by cardiology in ED  -Started on maintenance IV fluids @75  - Echo that showed a 40% estimated ejection fraction., was started on Metoprolol 12.5, Per cardiology , plan to start low dose entresto 12/13 bid     Cardiology Recs:   -Repeat troponin is downtrending relative to prior in the setting of resolving SURENDRA, argues against active myocarditis. No signs on echo to suggest perimyocarditis save mildly reduced EF  -Continue supportive care for flu  -Get repeat  limited TTE once pt out of flu precautions (vs day prior to discharge) to reevaluate EF, if EF is still <50%, please start low dose entresto 12/13 bid (given low normal Bps) and metop succ 12.5 daily for GDMT  -Cardiology f/u on discharge regardless  -Can consider stress cMRI, likely as an outpt, which would clarify if ischemia (unlikely) or myocarditis may be present    #Hyponatremia   -122 on arrival, uptrended to 124, 122 ,123,126  -likely due to hypovolemia and insensible losses   -normal mentation, no indication for hypertonic saline   -will continue to trend  - RFPs Daily      #SURENDRA  -Initial creatinine around 3.0 from a baseline of normal.  After fluid administration, creatinine down trended to the low twos.  -Likely prerenal in etiology given poor p.o. intake, insensible losses and diminished cardiac output.  -Strict I's and O's    #hypotension   -Likely hypovolemic as patient has been normotensive since fluid resuscitation in the ED.  -POCUS on arrival to MICU demonstrated A completely collapsible, very thin IVC suggesting that patient still has room for fluids.  -500CC crystalloid bolus on arrival to MICU     #hypokalemia   -Potassium 2.9 on arrival to MICU, 3.4 (1/31)  -Again likely due to dehydration,insensible losses and poor p.o. intake  -Potassium replenishment ordered    F: PRN  E: PRN  GI: Not indicated   A: PIV  DVT ppx: Heparin   O2: room air  Code Status: Full code           Emery Calvillo MD

## 2024-02-03 NOTE — CARE PLAN
Patient remained safe and free from falls and/or injury this shift. Patient continued on Tamiflu. Metoprolol held this shift due to hypotension. Bolus of 250 ml administered to the patient; provider made aware of repeat blood pressure of 92/58 post bolus with no new orders. PO potassium administered to the patient per EMR for a 3.4 potassium. Hemoglobin at 6.9, repeat CBC and type and screen sent. Repeat hemoglobin still pending result at this time. Patient updated throughout the duration of this shift. Blood pressure to the 100s this afternoon. No complaints of nausea or pain. New peripheral IV placed by RN; 20 gauge to the right forearm.    The clinical goals for the shift include patient blood pressure will remain > 110 systolic throughout the duration of this shift. Not met. Patient blood pressure went from the high 80s systolic to the low 100s systolic. Provider aware.    Patient plan of care ongoing.     Problem: Pain  Goal: My pain/discomfort is manageable  Outcome: Progressing     Problem: Safety  Goal: Patient will be injury free during hospitalization  Outcome: Progressing  Goal: I will remain free of falls  Outcome: Progressing     Problem: Daily Care  Goal: Daily care needs are met  Outcome: Progressing     Problem: Psychosocial Needs  Goal: Demonstrates ability to cope with hospitalization/illness  Outcome: Progressing  Goal: Collaborate with me, my family, and caregiver to identify my specific goals  Outcome: Progressing     Problem: Discharge Barriers  Goal: My discharge needs are met  Outcome: Progressing    Bhavya GUALLPA, RN, CMSRN

## 2024-02-03 NOTE — CARE PLAN
Problem: Pain  Goal: My pain/discomfort is manageable  Outcome: Progressing     Problem: Safety  Goal: Patient will be injury free during hospitalization  Outcome: Progressing  Goal: I will remain free of falls  Outcome: Progressing     Problem: Daily Care  Goal: Daily care needs are met  Outcome: Progressing     Problem: Psychosocial Needs  Goal: Demonstrates ability to cope with hospitalization/illness  Outcome: Progressing  Goal: Collaborate with me, my family, and caregiver to identify my specific goals  Outcome: Progressing     Problem: Discharge Barriers  Goal: My discharge needs are met  Outcome: Progressing   The patient's goals for the shift include sleep    The clinical goals for the shift include Patient will not show any signs of resp. distress during my shift    Over the shift, the patient did not make progress toward the following goals. Barriers to progression include difficulty coping. Recommendations to address these barriers include blood pressure medication, safety education.

## 2024-02-03 NOTE — SIGNIFICANT EVENT
Patient requesting to leave home ASAP  Labs notable for drop in hemoglobin. Vitals with soft pressure.   Patient with complaints of new black stools.  Patient counseled on gastrointestinal bleeding.  Stated that he did not get this until he came here.  I stated that during times of sickness since last a pre-existing condition can flare up such as an ulcer can start bleeding.    I stated that he is at risk of exsanguinating to death / and or synopsizing and hitting his head.  I stated that he will need to stay for evaluation and blood products.  He stated that he will think about it and let me know.    Plan in the meantime:  Type and screen stat  Repeat CBC  Pantoprazole 80 once followed by 40 twice daily if he stays

## 2024-02-04 LAB
ALBUMIN SERPL BCP-MCNC: 3 G/DL (ref 3.4–5)
ALP SERPL-CCNC: 50 U/L (ref 33–120)
ALT SERPL W P-5'-P-CCNC: 15 U/L (ref 10–52)
ANION GAP SERPL CALC-SCNC: 10 MMOL/L (ref 10–20)
APTT PPP: 29 SECONDS (ref 27–38)
AST SERPL W P-5'-P-CCNC: 38 U/L (ref 9–39)
BACTERIA BLD AEROBE CULT: ABNORMAL
BACTERIA BLD AEROBE CULT: ABNORMAL
BACTERIA BLD CULT: ABNORMAL
BACTERIA BLD CULT: ABNORMAL
BASOPHILS # BLD MANUAL: 0 X10*3/UL (ref 0–0.1)
BASOPHILS NFR BLD MANUAL: 0 %
BILIRUB DIRECT SERPL-MCNC: 0.2 MG/DL (ref 0–0.3)
BILIRUB SERPL-MCNC: 0.4 MG/DL (ref 0–1.2)
BUN SERPL-MCNC: 15 MG/DL (ref 6–23)
CALCIUM SERPL-MCNC: 8.5 MG/DL (ref 8.6–10.6)
CHLORIDE SERPL-SCNC: 105 MMOL/L (ref 98–107)
CO2 SERPL-SCNC: 24 MMOL/L (ref 21–32)
CREAT SERPL-MCNC: 0.78 MG/DL (ref 0.5–1.3)
EGFRCR SERPLBLD CKD-EPI 2021: >90 ML/MIN/1.73M*2
EOSINOPHIL # BLD MANUAL: 0 X10*3/UL (ref 0–0.7)
EOSINOPHIL NFR BLD MANUAL: 0 %
ERYTHROCYTE [DISTWIDTH] IN BLOOD BY AUTOMATED COUNT: 13.7 % (ref 11.5–14.5)
GLUCOSE SERPL-MCNC: 95 MG/DL (ref 74–99)
GRAM STN SPEC: ABNORMAL
GRAM STN SPEC: ABNORMAL
HCT VFR BLD AUTO: 21.7 % (ref 41–52)
HGB BLD-MCNC: 7.6 G/DL (ref 13.5–17.5)
IMM GRANULOCYTES # BLD AUTO: 0.04 X10*3/UL (ref 0–0.7)
IMM GRANULOCYTES NFR BLD AUTO: 0.7 % (ref 0–0.9)
INR PPP: 1.1 (ref 0.9–1.1)
LYMPHOCYTES # BLD MANUAL: 1.36 X10*3/UL (ref 1.2–4.8)
LYMPHOCYTES NFR BLD MANUAL: 23.9 %
MAGNESIUM SERPL-MCNC: 1.81 MG/DL (ref 1.6–2.4)
MCH RBC QN AUTO: 30.8 PG (ref 26–34)
MCHC RBC AUTO-ENTMCNC: 35 G/DL (ref 32–36)
MCV RBC AUTO: 88 FL (ref 80–100)
METAMYELOCYTES # BLD MANUAL: 0.1 X10*3/UL
METAMYELOCYTES NFR BLD MANUAL: 1.7 %
MONOCYTES # BLD MANUAL: 0.44 X10*3/UL (ref 0.1–1)
MONOCYTES NFR BLD MANUAL: 7.7 %
NEUTROPHILS # BLD MANUAL: 3.81 X10*3/UL (ref 1.2–7.7)
NEUTS BAND # BLD MANUAL: 0.1 X10*3/UL (ref 0–0.7)
NEUTS BAND NFR BLD MANUAL: 1.7 %
NEUTS SEG # BLD MANUAL: 3.71 X10*3/UL (ref 1.2–7)
NEUTS SEG NFR BLD MANUAL: 65 %
NRBC BLD-RTO: 0.4 /100 WBCS (ref 0–0)
PHOSPHATE SERPL-MCNC: 2.7 MG/DL (ref 2.5–4.9)
PLATELET # BLD AUTO: 216 X10*3/UL (ref 150–450)
POTASSIUM SERPL-SCNC: 3.8 MMOL/L (ref 3.5–5.3)
PROT SERPL-MCNC: 6.5 G/DL (ref 6.4–8.2)
PROTHROMBIN TIME: 12.9 SECONDS (ref 9.8–12.8)
RBC # BLD AUTO: 2.47 X10*6/UL (ref 4.5–5.9)
RBC MORPH BLD: NORMAL
SODIUM SERPL-SCNC: 135 MMOL/L (ref 136–145)
TARGETS BLD QL SMEAR: NORMAL
TOTAL CELLS COUNTED BLD: 117
WBC # BLD AUTO: 5.7 X10*3/UL (ref 4.4–11.3)

## 2024-02-04 PROCEDURE — 85610 PROTHROMBIN TIME: CPT

## 2024-02-04 PROCEDURE — 99232 SBSQ HOSP IP/OBS MODERATE 35: CPT

## 2024-02-04 PROCEDURE — C9113 INJ PANTOPRAZOLE SODIUM, VIA: HCPCS

## 2024-02-04 PROCEDURE — 83735 ASSAY OF MAGNESIUM: CPT

## 2024-02-04 PROCEDURE — 82248 BILIRUBIN DIRECT: CPT

## 2024-02-04 PROCEDURE — 84100 ASSAY OF PHOSPHORUS: CPT

## 2024-02-04 PROCEDURE — 80048 BASIC METABOLIC PNL TOTAL CA: CPT

## 2024-02-04 PROCEDURE — 1100000001 HC PRIVATE ROOM DAILY

## 2024-02-04 PROCEDURE — 36415 COLL VENOUS BLD VENIPUNCTURE: CPT

## 2024-02-04 PROCEDURE — 99222 1ST HOSP IP/OBS MODERATE 55: CPT | Performed by: INTERNAL MEDICINE

## 2024-02-04 PROCEDURE — 2500000004 HC RX 250 GENERAL PHARMACY W/ HCPCS (ALT 636 FOR OP/ED)

## 2024-02-04 PROCEDURE — 85007 BL SMEAR W/DIFF WBC COUNT: CPT

## 2024-02-04 PROCEDURE — 2500000001 HC RX 250 WO HCPCS SELF ADMINISTERED DRUGS (ALT 637 FOR MEDICARE OP)

## 2024-02-04 PROCEDURE — 85027 COMPLETE CBC AUTOMATED: CPT

## 2024-02-04 RX ADMIN — Medication 5 MG: at 20:20

## 2024-02-04 RX ADMIN — PANTOPRAZOLE SODIUM 40 MG: 40 INJECTION, POWDER, FOR SOLUTION INTRAVENOUS at 08:18

## 2024-02-04 RX ADMIN — OSELTAMIVIR PHOSPHATE 75 MG: 75 CAPSULE ORAL at 20:20

## 2024-02-04 RX ADMIN — OSELTAMIVIR PHOSPHATE 75 MG: 75 CAPSULE ORAL at 08:18

## 2024-02-04 RX ADMIN — PANTOPRAZOLE SODIUM 40 MG: 40 INJECTION, POWDER, FOR SOLUTION INTRAVENOUS at 20:20

## 2024-02-04 ASSESSMENT — COGNITIVE AND FUNCTIONAL STATUS - GENERAL
MOBILITY SCORE: 24
DAILY ACTIVITIY SCORE: 24
MOBILITY SCORE: 24
DAILY ACTIVITIY SCORE: 24

## 2024-02-04 ASSESSMENT — PAIN SCALES - GENERAL
PAINLEVEL_OUTOF10: 0 - NO PAIN
PAINLEVEL_OUTOF10: 0 - NO PAIN

## 2024-02-04 ASSESSMENT — PAIN SCALES - WONG BAKER
WONGBAKER_NUMERICALRESPONSE: NO HURT
WONGBAKER_NUMERICALRESPONSE: NO HURT

## 2024-02-04 NOTE — PROGRESS NOTES
Quincy Lee is a 43 y.o. male on day 5 of admission presenting with Flu.    Subjective   Patient denied SOB, improving, but denied any CP, palpitation or any other symptoms. Patient is saturating well on room air.  Patient had black starry stools yesterday.         Objective   Physical Exam  Constitutional:       General: He is not in acute distress.     Appearance: Normal appearance. He is not ill-appearing.   Eyes:      Extraocular Movements: Extraocular movements intact.   Cardiovascular:      Rate and Rhythm: Normal rate and regular rhythm.      Heart sounds: Normal heart sounds.   Pulmonary:      Breath sounds: Rales present.      Comments: On room air  Abdominal:      General: Abdomen is flat. There is no distension.      Palpations: Abdomen is soft.      Tenderness: There is no abdominal tenderness. There is no guarding or rebound.   Musculoskeletal:         General: No swelling or tenderness. Normal range of motion.   Skin:     General: Skin is warm.   Neurological:      General: No focal deficit present.      Mental Status: He is alert and oriented to person, place, and time.      Motor: No weakness.   Psychiatric:         Mood and Affect: Mood normal.         Behavior: Behavior normal.       Last Recorded Vitals  Blood pressure 96/59, pulse 86, temperature 35.9 °C (96.6 °F), temperature source Temporal, resp. rate 17, height 1.829 m (6'), weight 66.2 kg (145 lb 15.1 oz), SpO2 100 %.  Intake/Output last 3 Shifts:  I/O last 3 completed shifts:  In: 872 (13.2 mL/kg) [Blood:372; IV Piggyback:500]  Out: 2775 (41.9 mL/kg) [Urine:2775 (1.2 mL/kg/hr)]  Weight: 66.2 kg     Relevant Results                             Assessment/Plan   Principal Problem:    Flu  Active Problems:    Acute insomnia    GI bleed    Mr. Lee is a 43-year-old male with past medical history of asthma who was admitted to the MICU for influenza pneumonia and myocarditis with hemodynamic instability and evidence of cardiac dysfunction and  hypoperfusion.Patient presented to our ER (1/30/2024) with 2 to 3-day history of a flulike illness .    Updates (2/4):  - GI was consulted will follow Recs,   -NPO at [facility] for possible EGD         #Flu A infection with viral vs atypical pneumonia   -Diffuse, patchy, bilateral infiltrates noted on CT PE likely representing a viral pneumonia versus less likely atypical bacterial pneumonia.  -Pt with 4-5L oxygen requirement, will wean as tolerated.   -Follow-up blood cultures drawn in the ED x 2  -Continue ceftriaxone and azithromycin  -Follow up MRSA nares due to his risk of superimposed MRSA pneumonia in the setting of severe flu infection  -Continue Tamiflu 75 mg twice daily x 5 days  -strep/legionella urine antigen negative     #Possible upper GI bleed  - patient is complaining of black starry stools,Patient had soft blood pressure 2/3 with a Hgb drop to 6.9 plan to give a 1U of PRBC.   - GI was consulted will follow Recs,   -NPO at [facility] for possible EGD      #h/o asthma with mild viral induced asthma exacerbation  -albuterol PRNq6 ordered for wheezing/dyspnea     #viral myocarditis with reduced EF  -EF 40-45% on TTE done today.   -Troponins elevated but now downtrending X2, BNP elevated, now down trending   -supportive care, standard HF treatment if patient's cardiac Function worsens or he begins to demonstrate signs/symptoms of decompensated heart failure  -Seen by cardiology in ED  -Started on maintenance IV fluids @75  - Echo that showed a 40% estimated ejection fraction., was started on Metoprolol 12.5, Per cardiology , plan to start low dose entresto 12/13 bid     Cardiology Recs:   -Repeat troponin is downtrending relative to prior in the setting of resolving SURENDRA, argues against active myocarditis. No signs on echo to suggest perimyocarditis save mildly reduced EF  -Continue supportive care for flu  -Get repeat limited TTE once pt out of flu precautions (vs day prior to discharge) to reevaluate EF, if EF is  still <50%, please start low dose entresto 12/13 bid (given low normal Bps) and metop succ 12.5 daily for GDMT  -Cardiology f/u on discharge regardless  -Can consider stress cMRI, likely as an outpt, which would clarify if ischemia (unlikely) or myocarditis may be present    #Hyponatremia   -122 on arrival, uptrended to 124, 122 ,123,126,135  -likely due to hypovolemia and insensible losses   -normal mentation, no indication for hypertonic saline   -will continue to trend  - RFPs Daily      #SURENDRA  -Initial creatinine around 3.0 from a baseline of normal.  After fluid administration, creatinine down trended to the low twos.  -Likely prerenal in etiology given poor p.o. intake, insensible losses and diminished cardiac output.  -Strict I's and O's    #hypotension   -Likely hypovolemic as patient has been normotensive since fluid resuscitation in the ED.  -POCUS on arrival to MICU demonstrated A completely collapsible, very thin IVC suggesting that patient still has room for fluids.  -500CC crystalloid bolus on arrival to MICU     #hypokalemia   -Potassium 2.9 on arrival to MICU, 3.4 (1/31)  -Again likely due to dehydration,insensible losses and poor p.o. intake  -Potassium replenishment ordered    F: PRN  E: PRN  GI: Not indicated   A: PIV  DVT ppx: Heparin   O2: room air  Code Status: Full code           Eemry Calvillo MD

## 2024-02-04 NOTE — CARE PLAN
The patient's goals for the shift include sleep    The clinical goals for the shift include patient blood pressure will remain > 110 systolic throughout the duration of this shift.    Over the shift, the patient did not make progress toward the following goals. Barriers to progression include dehydration. Recommendations to address these barriers include encourage fluids and hydration.

## 2024-02-04 NOTE — CONSULTS
[facility]   [facility] [place]  INITIAL CONSULT NOTE     Source of Information: The source of the history was patient    Consult requested by: Service: Medicine    Reason for Consult: Melena and Hb drop anemia     Admission Chief Complaint: Flu like symptoms       SUBJECTIVE     HPI: Quincy Lee is a 43 y.o. male w/PMH of asthma p/w flulike illness, admitted to MICU for influenza PNA (+ 1/30) and myocarditis with decreased EF 40~45% and global hypokinesis (Trop peaked at 177).  Patient developed Hb drop anemia and new dark stools for which GI consulted.  Per chart review, Hb dropped ~7 in 2 days; 13.3 (2/1) --> 9.7 (2/2) -->6.9 (2/3) -->1U-->7.6 (2/4). UN 25 on admission uptrended to 53. INR 1.1.  Pt had dark watery stools yesterday which never happened previously. Denies hematemesis. Denies taking NSAIDs, AC. Denies Fhx of GI malignancies. Never had EGD. Pt agrees to stay as he will be joining court hearing over a job dispute from [employer].     ROS: Complete review of systems obtained, negative unless otherwise indicated above.     No Known Allergies  History reviewed. No pertinent past medical history.  History reviewed. No pertinent surgical history.  No family history on file.  Social History     Social History Narrative    Not on file       Medications:  melatonin, 5 mg, oral, Nightly  [Held by provider] metoprolol succinate XL, 12.5 mg, oral, Daily  oseltamivir, 75 mg, oral, BID  pantoprazole, 40 mg, intravenous, BID      PRN medications: acetaminophen, albuterol, HYDROmorphone, ipratropium-albuteroL      EXAM     Vital signs:      2/3/2024     8:02 PM 2/3/2024     8:04 PM 2/3/2024     8:21 PM 2/3/2024     9:01 PM 2/3/2024    10:08 PM 2/3/2024    10:50 PM 2/4/2024     5:02 AM   Vitals   Systolic 105  94 90 100 99 96   Diastolic 67  71 66 60 61 59   Heart Rate 88  93 90 94 90 86   Temp 36 °C (96.8 °F)  36.5 °C (97.7 °F) 37 °C (98.6 °F) 36.6 °C (97.9 °F) 36.7 °C (98  °F) 35.9 °C (96.6 °F)   Resp  20 20 20 20 20 17         Physical Exam   General: NAD, AA&O x 3  Eyes: EOMI, PERRLA  ENT: MMM  Heart: RRR  Lungs: No respiratory distress  Abdomen: Soft, non tender, non distended  Skin: No jaundice   Neuro: Appropriately responds to questions/commands       DATA                                                                            Labs     Results for orders placed or performed during the hospital encounter of 01/30/24 (from the past 24 hour(s))   Type And Screen   Result Value Ref Range    ABO TYPE A     Rh TYPE POS     ANTIBODY SCREEN NEG    Prepare RBC: 1 Units   Result Value Ref Range    PRODUCT CODE J2897A67     Unit Number [id number]     Unit ABO A     Unit RH POS     XM INTEP COMP     Dispense Status TR     Blood Expiration Date February 09, 2024 23:59 EST     PRODUCT BLOOD TYPE 6200     UNIT VOLUME 350    VERIFY ABO/Rh Group Test   Result Value Ref Range    ABO TYPE A     Rh TYPE POS                                                                                 Imaging           Lower extremity venous duplex bilateral    Result Date: 2/2/2024            [place] [address]   Tel [phone] and Fax [phone]  Vascular Lab Report [facility] LOWER EXTREMITY VENOUS DUPLEX BILATERAL  Patient Name:     KARINA LARSEN         Reading           [id number] Bhavya Zimmer MD                                        Physician: Study Date:       2/2/2024             Ordering          [id number] KYA DEL RIO                                        Physician:        SAHIL MRN/PID:          [id number]             Technologist:     Dee Young RVT Accession#:       [id number]         Technologist 2: Date of           1980 / 43      Encounter#:       [id number] Birth/Age:        years Gender:           M Admission Status: Inpatient            Location          [facility]                                        Performed:   Diagnosis/ICD: Localized (leg) edema-R60.0 CPT Codes:     23348 Peripheral venous duplex scan for DVT complete  CONCLUSIONS: Right Lower Venous: No evidence of acute deep vein thrombus visualized in the right lower extremity. Left Lower Venous: No evidence of acute deep vein thrombus visualized in the left lower extremity.  Imaging & Doppler Findings:  Right                 Compressible Thrombus        Flow Distal External Iliac                None CFV                       Yes        None   Spontaneous/Phasic PFV                       Yes        None FV Proximal               Yes        None   Spontaneous/Phasic FV Mid                    Yes        None FV Distal                 Yes        None Popliteal                 Yes        None   Spontaneous/Phasic Peroneal                  Yes        None PTV                       Yes        None  Left                  Compress Thrombus        Flow Distal External Iliac            None CFV                     Yes      None   Spontaneous/Phasic PFV                     Yes      None FV Proximal             Yes      None   Spontaneous/Phasic FV Mid                  Yes      None FV Distal               Yes      None Popliteal               Yes      None   Spontaneous/Phasic Peroneal                Yes      None PTV                     Yes      None  [id number] hBavya Zimmer MD Electronically signed by [id number] Bhavya Zimmer MD on 2/2/2024 at 4:22:15 PM  ** Final **     Transthoracic Echo (TTE) Limited    Result Date: 2/2/2024   [facility], [address] [place] [address]                Tel [phone] and Fax [phone] TRANSTHORACIC ECHOCARDIOGRAM REPORT  Patient Name:      KARINA Cummings Physician:    [id number] Alise Chanel MD Study Date:        2/2/2024             Ordering Provider:    [id number] FEDERICO FERRARO MRN/PID:            [id number]             Fellow: Accession#:        [id number]         Nurse: Date of Birth/Age: 1980 / 43      Sonographer:          Cheryl Garcia RCS                    years Gender:            M                    Additional Staff: Height:            182.00 cm            Admit Date: Weight:            64.86 kg             Admission Status:     Inpatient -                                                               Routine BSA:               1.84 m2              Encounter#:           [id number]                                         Department Location:  [place] [address] Blood Pressure: 118 /74 mmHg Study Type:    TRANSTHORACIC ECHO (TTE) LIMITED Diagnosis/ICD: Acute myocarditis, unspecified-I40.9 Indication:    Acute myocarditis CPT Code:      Echo Limited-49401; Doppler Limited-92948; Color Doppler-95311 Patient History: Pertinent History: COPD and HTN. Study Detail: The following Echo studies were performed: 2D, M-Mode, Doppler and               color flow.  PHYSICIAN INTERPRETATION: Left Ventricle: The left ventricular systolic function is mildly to moderately decreased, with an estimated ejection fraction of 40%. The left ventricular cavity size is mildly dilated. Left ventricular diastolic filling was indeterminate. Left Atrium: The left atrium is normal in size. Right Ventricle: The right ventricle is mildly enlarged. There is mildly reduced right ventricular systolic function. Right Atrium: The right atrium is normal in size. Aortic Valve: The aortic valve is trileaflet. There is mild aortic valve regurgitation. Mitral Valve: The mitral valve is normal in structure. There is trace mitral valve regurgitation. Tricuspid Valve: The tricuspid valve is structurally normal. There is trace tricuspid regurgitation. Pulmonic Valve: The pulmonic valve is structurally normal. There is physiologic pulmonic valve regurgitation. Pericardium: There is no pericardial effusion noted. Aorta: The aortic root is  normal. There is no dilatation of the ascending aorta. Systemic Veins: The inferior vena cava appears to be of normal size. There is IVC inspiratory collapse greater than 50%. In comparison to the previous echocardiogram(s): Comparable to the prior echocardiogram from 01/2024.  CONCLUSIONS:  1. Left ventricular systolic function is mildly to moderately decreased with a 40% estimated ejection fraction.  2. There is mildly reduced right ventricular systolic function.  3. Mild aortic valve regurgitation. QUANTITATIVE DATA SUMMARY: 2D MEASUREMENTS:                           Normal Ranges: Ao Root d:     3.40 cm    (2.0-3.7cm) LAs:           2.90 cm    (2.7-4.0cm) IVSd:          0.90 cm    (0.6-1.1cm) LVPWd:         1.00 cm    (0.6-1.1cm) LVIDd:         6.20 cm    (3.9-5.9cm) LVIDs:         4.90 cm LV Mass Index: 132.8 g/m2 LV % FS        21.0 % LA VOLUME:                               Normal Ranges: LA Vol A4C:        44.2 ml    (22+/-6mL/m2) LA Vol A2C:        50.0 ml LA Vol BP:         48.1 ml LA Vol Index A4C:  24.0ml/m2 LA Vol Index A2C:  27.1 ml/m2 LA Vol Index BP:   26.1 ml/m2 LA Area A4C:       15.5 cm2 LA Area A2C:       16.1 cm2 LA Major Axis A4C: 4.6 cm LA Major Axis A2C: 4.4 cm LA Volume Index:   26.1 ml/m2 RA VOLUME BY A/L METHOD:                               Normal Ranges: RA Vol A4C:        39.1 ml    (8.3-19.5ml) RA Vol Index A4C:  21.2 ml/m2 RA Area A4C:       12.9 cm2 RA Major Axis A4C: 3.6 cm AORTA MEASUREMENTS:                    Normal Ranges: Asc Ao, d: 2.80 cm (2.1-3.4cm) LV SYSTOLIC FUNCTION BY 2D PLANIMETRY (MOD):                     Normal Ranges: EF-A4C View: 44.1 % (>=55%) EF-A2C View: 33.8 % EF-Biplane:  39.6 % LV DIASTOLIC FUNCTION:                        Normal Ranges: MV e'         0.10 m/s (>8.0) MV lateral e' 0.12 m/s MV medial e'  0.08 m/s AORTIC VALVE:                        Normal Ranges: LVOT Diameter: 2.10 cm (1.8-2.4cm)  RIGHT VENTRICLE: RV Basal 3.85 cm RV Mid   2.90 cm RV Major  7.6 cm TAPSE:   14.7 mm RV s'    0.10 m/s  [id number] Alise Chanel MD Electronically signed on 2/2/2024 at 10:05:48 AM  ** Final **     ECG 12 Lead    Result Date: 1/31/2024  Sinus tachycardia Voltage criteria for left ventricular hypertrophy Nonspecific T wave abnormality Abnormal ECG When compared with ECG of 30-JAN-2024 09:56, ST no longer elevated in Anterior leads Nonspecific T wave abnormality now evident in Inferior leads Nonspecific T wave abnormality, worse in Anterolateral leads    XR chest 1 view    Result Date: 1/31/2024  Interpreted By:  Jv Bhakta and Liller Gregory STUDY: XR CHEST 1 VIEW;  1/30/2024 10:20 pm   INDICATION: Signs/Symptoms:myocarditis, flu pneumonia and decreased systolic function.   COMPARISON: CT chest 01/30/2024   ACCESSION NUMBER(S): [id number]   ORDERING CLINICIAN: PEPPER MCGOVERN   FINDINGS: AP radiograph of the chest was provided.   CARDIOMEDIASTINAL SILHOUETTE: Cardiomediastinal silhouette is normal in size and configuration.   LUNGS: Redemonstration of multifocal patchy airspace opacities as the marked with arrows on PACs within the right perihilar region and left lower lung which are better appreciated on CT chest from 01/30/2024. No pneumothorax or pleural effusion.   ABDOMEN: No remarkable upper abdominal findings.   BONES: No osseous injury.       Similar multifocal airspace opacities concerning for multifocal pneumonia when compared to CT chest from 01/30/2024. No pneumothorax or pleural effusion. Continued follow-up to resolution is recommended.   I personally reviewed the images/study and I agree with the findings as stated above by resident physician, Dr. Bartolo Pozo. The study was interpreted at [facility] in [facility].   MACRO: none.   Signed by: Jv Bhakta 1/31/2024 9:30 AM Dictation workstation:   [id number]    CT head wo IV contrast    Result Date: 1/31/2024  Interpreted By:  Ashlyn Zarate, STUDY: CT HEAD  WO IV CONTRAST;  1/31/2024 7:24 am   INDICATION: Signs/Symptoms:fall with headstrike.   COMPARISON: None.   ACCESSION NUMBER(S): [id number]   ORDERING CLINICIAN: PEPPER MCGOVERN   TECHNIQUE: Noncontrast axial CT scan of head was performed. Angled reformats in brain and bone windows were generated. The images were reviewed in bone, brain, blood and soft tissue windows.   FINDINGS: CSF Spaces: The ventricles, sulci and basal cisterns are within normal limits. There is no extraaxial fluid collection.   Parenchyma:  The grey-white differentiation is intact. There is no mass effect or midline shift.  There is no intracranial hemorrhage.   Calvarium: The calvarium is unremarkable. Multilobulated cystic appearing scalp lesion within the right temporal region may represent a sebaceous cyst.   Paranasal sinuses and mastoids: Visualized paranasal sinuses and mastoids are predominantly clear. Mucous retention cyst versus polyp within the left maxillary sinus.       No evidence of intracranial hemorrhage or displaced skull fracture.   MACRO: None   Signed by: Ashlyn Zarate 1/31/2024 8:00 AM Dictation workstation:   [id number]    ECG 12 lead    Result Date: 1/31/2024  Supraventricular tachycardia Minimal voltage criteria for LVH, may be normal variant ( Mazon product ) Borderline ECG When compared with ECG of 29-NOV-2021 09:44, Previous ECG has undetermined rhythm, needs review ST elevation now present in Anterior leads T wave inversion no longer evident in Inferior leads See ED provider note for full interpretation and clinical correlation Confirmed by Gavino Avila ([id number]) on 1/31/2024 4:41:39 AM    Transthoracic Echo (TTE) Complete    Result Date: 1/30/2024   [facility], [address] [place] [address]                Tel [phone] and Fax [phone] TRANSTHORACIC ECHOCARDIOGRAM REPORT  Patient Name:      KARINA LARSEN         Reading Physician:    [id number] Sotelo                                                                [place]qu[place] OMER Study Date:        1/30/2024            Ordering Provider:    [id number] PITA CARROLLWELL MRN/PID:           [id number]             Fellow: Accession#:        [id number]         Nurse: Date of Birth/Age: 1980 / 43      Sonographer:          Ajith lara                                      RDCS Gender:            M                    Additional Staff: Height:            182.88 cm            Admit Date:           1/30/2024 Weight:            86.18 kg             Admission Status:     Inpatient - STAT BSA:               2.08 m2              Encounter#:           [id number]                                         Department Location:  [facility] Blood Pressure: 122 /85 mmHg Study Type:    TRANSTHORACIC ECHO (TTE) COMPLETE Diagnosis/ICD: Encounter for screening for cardiovascular disorders-Z13.6 Indication:    Screening for cardiovascular disorders CPT Code:      Echo Complete w Full Doppler-85913 Patient History: Pertinent History: Asthma, flu-like symptoms, SOB, CP. Study Detail: The following Echo studies were performed: 2D, M-Mode, Doppler and               color flow. Definity used as a contrast agent for endocardial               border definition. Total contrast used for this procedure was 3.0               mL via IV push. Patient's heart rhythm is sinus tachycardia.  PHYSICIAN INTERPRETATION: Left Ventricle: The left ventricular systolic function is mildly decreased, with an estimated ejection fraction of 40-45%. There is global hypokinesis of the left ventricle with minor regional variations. The left ventricular cavity size is mildly dilated. Left ventricular diastolic filling was indeterminate. Left Atrium: The left atrium is normal in size. Right Ventricle: The right ventricle is normal in size. There is mildly reduced right ventricular systolic function. Right Atrium: The  right atrium is normal in size. Aortic Valve: The aortic valve is trileaflet. There is mild to moderate aortic valve regurgitation. The peak instantaneous gradient of the aortic valve is 11.3 mmHg. Mitral Valve: The mitral valve is mildly thickened. There is trace mitral valve regurgitation. Tricuspid Valve: The tricuspid valve is structurally normal. There is trace tricuspid regurgitation. The right ventricular systolic pressure is unable to be estimated. Pulmonic Valve: The pulmonic valve is not well visualized. There is physiologic pulmonic valve regurgitation. Pericardium: There is no pericardial effusion noted. Aorta: The aortic root is normal. The aortic root appears normal in size and measures 3.40 cm. There is no dilatation of the ascending aorta. Systemic Veins: The inferior vena cava size appears small. There is IVC inspiratory collapse greater than 50%. In comparison to the previous echocardiogram(s): Compared with study from 12/11/2019, the previous study was a JABARI. On the current study, LV function is less vigorous (LVEF 55% previously) and there is less aortic regurgitation. RV function is now mildly reduced.  CONCLUSIONS:  1. Left ventricular systolic function is mildly decreased with a 40-45% estimated ejection fraction.  2. There is global hypokinesis of the left ventricle with minor regional variations.  3. There is mildly reduced right ventricular systolic function.  4. Mild to moderate aortic valve regurgitation.  5. Normal aortic root. QUANTITATIVE DATA SUMMARY: 2D MEASUREMENTS:                          Normal Ranges: Ao Root d:     3.40 cm   (2.0-3.7cm) LAs:           3.10 cm   (2.7-4.0cm) IVSd:          0.70 cm   (0.6-1.1cm) LVPWd:         0.65 cm   (0.6-1.1cm) LVIDd:         5.47 cm   (3.9-5.9cm) LVIDs:         5.19 cm LV Mass Index: 61.6 g/m2 LV % FS        5.1 % LA VOLUME:                               Normal Ranges: LA Vol A4C:        28.5 ml    (22+/-6mL/m2) LA Vol A2C:        50.2 ml LA  Vol BP:         38.3 ml LA Vol Index A4C:  13.7ml/m2 LA Vol Index A2C:  24.1 ml/m2 LA Vol Index BP:   18.4 ml/m2 LA Area A4C:       12.5 cm2 LA Area A2C:       16.4 cm2 LA Major Axis A4C: 4.7 cm LA Major Axis A2C: 4.6 cm LA Volume Index:   18.4 ml/m2 RA VOLUME BY A/L METHOD:                       Normal Ranges: RA Area A4C: 11.8 cm2 AORTA MEASUREMENTS:                    Normal Ranges: Asc Ao, d: 3.26 cm (2.1-3.4cm) LV SYSTOLIC FUNCTION BY 2D PLANIMETRY (MOD):                     Normal Ranges: EF-A4C View: 55.0 % (>=55%) EF-A2C View: 33.7 % EF-Biplane:  43.0 % LV DIASTOLIC FUNCTION:                        Normal Ranges: MV e'         0.06 m/s (>8.0) MV lateral e' 0.07 m/s MV medial e'  0.06 m/s a'            0.09 m/s AORTIC VALVE:                          Normal Ranges: AoV Vmax:      1.68 m/s  (<=1.7m/s) AoV Peak P.3 mmHg (<20mmHg) LVOT Max Paul:  1.02 m/s  (<=1.1m/s) LVOT VTI:      14.20 cm LVOT Diameter: 2.00 cm   (1.8-2.4cm) AoV Area,Vmax: 1.91 cm2  (2.5-4.5cm2)  RIGHT VENTRICLE: RV Basal 3.60 cm TAPSE:   7.8 mm RV s'    0.08 m/s TRICUSPID VALVE/RVSP:                   Normal Ranges: IVC Diam: 1.10 cm PULMONIC VALVE:                      Normal Ranges: PV Max Paul: 0.5 m/s  (0.6-0.9m/s) PV Max P.0 mmHg  [id number] Deepak Kwan MD Electronically signed on 2024 at 5:04:47 PM  ** Final **     CT angio chest for pulmonary embolism    Addendum Date: 2024    NOTIFICATION:  Non-critical findings were relayed by Radiology Support Staff to Dr elizabeth Pompa on 2024 at 12:10 hours. Signed by Rambo Carrasco MD    Result Date: 2024  STUDY: CT Angiogram of the Chest; 24 at 11:42 AM INDICATION: Tachycardia, hypoxic, viral syndrome. COMPARISON: Chest XR 3/21/22. ACCESSION NUMBER(S): [id number] ORDERING CLINICIAN: ELIZABETH POMPA TECHNIQUE:  CTA of the chest was performed with intravenous contrast. Images are reviewed and processed at a workstation according to the CT angiogram protocol  with 3-D and/or MIP post processing imaging generated.  Omnipaque 350 90 mL was administered intravenously. Automated mA/kV exposure control was utilized and patient examination was performed in strict accordance with principles of ALARA. FINDINGS: Pulmonary arteries are adequately opacified without acute or chronic filling defects.  The thoracic aorta is normal in course and caliber without dissection or aneurysm. The heart is normal in size without pericardial effusion.  Mildly enlarged mediastinal and hilar lymph nodes. There is no pleural effusion, pleural thickening, or pneumothorax. Extensive bilateral bronchiectasis. Prominent bilateral peribronchial thickening noted as well as focal areas of pulmonary consolidation consistent with diffuse bilateral pneumonia sparing the left upper lobe. Upper abdomen demonstrates no acute pathology. There are no acute fractures.  No suspicious bony lesions.    No evidence of acute pulmonary embolism. Extensive diffuse bilateral pneumonia sparing the left upper lobe. Extensive bronchiectasis. Reactive mediastinal and hilar lymphadenopathy. Signed by Rambo Carrasco MD    Point of Care Ultrasound    Result Date: 1/30/2024  Baldomero Pompa DO     1/30/2024  5:00 PM Performed by: Baldomero Pompa DO Authorized by: Anand Vann DO  Cardiac Indications: chest pain and tachycardia Consitutional Indications: dehydration Procedure: Cardiac Ultrasound Findings:  Views: parasternal long and subxiphoid The pericardial space was visualized and was NEGATIVE for a significant pericardial effusion. Activity: Ventricular contractions were visualized. LV: LV systolic function was DECREASED. RV: RV size was NORMAL. Impression: Cardiac: The focused cardiac ultrasound exam had ABNORMAL findings as specified.                                                                               GI Procedures           ASSESSMENT / PLAN                  Assessment and Recommendations:     Quincy  Jesus is a 43 y.o. male w/PMH of asthma p/w flulike illness, admitted to MICU for influenza PNA (+ 1/30) and myocarditis with decreased EF 40~45% and global hypokinesis (Trop peaked at 177). Patient developed Hb drop anemia of ~7 in 2 days and elevation UN with new dark stools for which GI consulted.    #Dark stool  #Hb drop anemia  #Viral myocarditis with decreased EF 40~45% and global hypokinesis (Trop peaked at 177)  ::Developed melena.   ::Hb dropped ~7 in 2 days; 13.3 (2/1) --> 9.7 (2/2) -->6.9 (2/3) -->1U-->7.6 (2/4).   ::UN 25 on admission uptrended to 53.   -Suspicious for UGIB with differential include PUD, esophagitis/gastritis provoked by flu infection c/b myocarditis and PNA. Pt is currently saturating 100% on RA.     Plan  - Plan for EGD 2/5   - Make NPO at midnight  - Continue IV PPI   - Trend CBC, transfuse to goal Hgb >7 and platelets >50   - Maintain two large bore IVs and active type and screen   - Hold anticoagulation and antiplatelet agents; Avoid NSAIDs    - Monitor for bleeding, if patient develops significant bleeding with hemodynamic instability, obtain STAT CT angio A/P and consult IR for possible embolization   - Ensure Hb > 7, platelets > 50, K > 4, and Mg > 2 on day of procedure     GONZALO per primary team.   ------------------------------------------------------------------------  Taylor Blackwell MD   Gastroenterology Fellow    After 5PM and on Weekends, please page on-call fellow.    Case discussed with on call attending Dr. Ann.  To be seen by service attending Dr. Barton on Monday.

## 2024-02-04 NOTE — ED PROCEDURE NOTE
Procedure  Critical Care    Performed by: Anand Vann DO  Authorized by: Anand Vann DO    Critical care provider statement:     Critical care time (minutes):  35    Critical care time was exclusive of:  Separately billable procedures and treating other patients and teaching time    Critical care was necessary to treat or prevent imminent or life-threatening deterioration of the following conditions:  Cardiac failure    Critical care was time spent personally by me on the following activities:  Development of treatment plan with patient or surrogate, blood draw for specimens, discussions with consultants, evaluation of patient's response to treatment and examination of patient    Care discussed with: admitting provider                 Anand Vann DO  02/04/24 1210

## 2024-02-04 NOTE — CARE PLAN
The patient's goals for the shift include sleep    The clinical goals for the shift include Pt will have no c/o of pain or rate pain <4 this shift

## 2024-02-05 ENCOUNTER — APPOINTMENT (OUTPATIENT)
Dept: GASTROENTEROLOGY | Facility: HOSPITAL | Age: 44
End: 2024-02-05
Payer: MEDICAID

## 2024-02-05 ENCOUNTER — ANESTHESIA EVENT (OUTPATIENT)
Dept: GASTROENTEROLOGY | Facility: HOSPITAL | Age: 44
End: 2024-02-05
Payer: MEDICAID

## 2024-02-05 ENCOUNTER — ANESTHESIA (OUTPATIENT)
Dept: GASTROENTEROLOGY | Facility: HOSPITAL | Age: 44
End: 2024-02-05
Payer: MEDICAID

## 2024-02-05 LAB
ALBUMIN SERPL BCP-MCNC: 2.9 G/DL (ref 3.4–5)
ANION GAP SERPL CALC-SCNC: 11 MMOL/L (ref 10–20)
BASOPHILS # BLD AUTO: 0.02 X10*3/UL (ref 0–0.1)
BASOPHILS NFR BLD AUTO: 0.4 %
BUN SERPL-MCNC: 9 MG/DL (ref 6–23)
CALCIUM SERPL-MCNC: 8.6 MG/DL (ref 8.6–10.6)
CHLORIDE SERPL-SCNC: 104 MMOL/L (ref 98–107)
CO2 SERPL-SCNC: 24 MMOL/L (ref 21–32)
CREAT SERPL-MCNC: 0.57 MG/DL (ref 0.5–1.3)
EGFRCR SERPLBLD CKD-EPI 2021: >90 ML/MIN/1.73M*2
EOSINOPHIL # BLD AUTO: 0.07 X10*3/UL (ref 0–0.7)
EOSINOPHIL NFR BLD AUTO: 1.4 %
ERYTHROCYTE [DISTWIDTH] IN BLOOD BY AUTOMATED COUNT: 13.5 % (ref 11.5–14.5)
GLUCOSE SERPL-MCNC: 90 MG/DL (ref 74–99)
HCT VFR BLD AUTO: 21.1 % (ref 41–52)
HGB BLD-MCNC: 7.3 G/DL (ref 13.5–17.5)
IMM GRANULOCYTES # BLD AUTO: 0.02 X10*3/UL (ref 0–0.7)
IMM GRANULOCYTES NFR BLD AUTO: 0.4 % (ref 0–0.9)
LYMPHOCYTES # BLD AUTO: 2 X10*3/UL (ref 1.2–4.8)
LYMPHOCYTES NFR BLD AUTO: 40 %
MAGNESIUM SERPL-MCNC: 1.87 MG/DL (ref 1.6–2.4)
MCH RBC QN AUTO: 30.2 PG (ref 26–34)
MCHC RBC AUTO-ENTMCNC: 34.6 G/DL (ref 32–36)
MCV RBC AUTO: 87 FL (ref 80–100)
MONOCYTES # BLD AUTO: 0.74 X10*3/UL (ref 0.1–1)
MONOCYTES NFR BLD AUTO: 14.8 %
NEUTROPHILS # BLD AUTO: 2.15 X10*3/UL (ref 1.2–7.7)
NEUTROPHILS NFR BLD AUTO: 43 %
NRBC BLD-RTO: 0 /100 WBCS (ref 0–0)
PHOSPHATE SERPL-MCNC: 3.3 MG/DL (ref 2.5–4.9)
PLATELET # BLD AUTO: 373 X10*3/UL (ref 150–450)
POLYCHROMASIA BLD QL SMEAR: NORMAL
POTASSIUM SERPL-SCNC: 4 MMOL/L (ref 3.5–5.3)
RBC # BLD AUTO: 2.42 X10*6/UL (ref 4.5–5.9)
RBC MORPH BLD: NORMAL
SODIUM SERPL-SCNC: 135 MMOL/L (ref 136–145)
TARGETS BLD QL SMEAR: NORMAL
WBC # BLD AUTO: 5 X10*3/UL (ref 4.4–11.3)

## 2024-02-05 PROCEDURE — 85025 COMPLETE CBC W/AUTO DIFF WBC: CPT

## 2024-02-05 PROCEDURE — 3700000001 HC GENERAL ANESTHESIA TIME - INITIAL BASE CHARGE: Performed by: INTERNAL MEDICINE

## 2024-02-05 PROCEDURE — 0DB68ZX EXCISION OF STOMACH, VIA NATURAL OR ARTIFICIAL OPENING ENDOSCOPIC, DIAGNOSTIC: ICD-10-PCS | Performed by: INTERNAL MEDICINE

## 2024-02-05 PROCEDURE — 36415 COLL VENOUS BLD VENIPUNCTURE: CPT

## 2024-02-05 PROCEDURE — 2500000001 HC RX 250 WO HCPCS SELF ADMINISTERED DRUGS (ALT 637 FOR MEDICARE OP)

## 2024-02-05 PROCEDURE — 1100000001 HC PRIVATE ROOM DAILY

## 2024-02-05 PROCEDURE — 83735 ASSAY OF MAGNESIUM: CPT

## 2024-02-05 PROCEDURE — 82728 ASSAY OF FERRITIN: CPT

## 2024-02-05 PROCEDURE — 7100000009 HC PHASE TWO TIME - INITIAL BASE CHARGE: Performed by: INTERNAL MEDICINE

## 2024-02-05 PROCEDURE — 2500000005 HC RX 250 GENERAL PHARMACY W/O HCPCS: Performed by: ANESTHESIOLOGIST ASSISTANT

## 2024-02-05 PROCEDURE — A43239 PR EDG TRANSORAL BIOPSY SINGLE/MULTIPLE: Performed by: ANESTHESIOLOGY

## 2024-02-05 PROCEDURE — 2500000004 HC RX 250 GENERAL PHARMACY W/ HCPCS (ALT 636 FOR OP/ED): Performed by: ANESTHESIOLOGIST ASSISTANT

## 2024-02-05 PROCEDURE — 43255 EGD CONTROL BLEEDING ANY: CPT | Performed by: INTERNAL MEDICINE

## 2024-02-05 PROCEDURE — 43239 EGD BIOPSY SINGLE/MULTIPLE: CPT | Performed by: INTERNAL MEDICINE

## 2024-02-05 PROCEDURE — 7100000010 HC PHASE TWO TIME - EACH INCREMENTAL 1 MINUTE: Performed by: INTERNAL MEDICINE

## 2024-02-05 PROCEDURE — 3700000002 HC GENERAL ANESTHESIA TIME - EACH INCREMENTAL 1 MINUTE: Performed by: INTERNAL MEDICINE

## 2024-02-05 PROCEDURE — 2500000004 HC RX 250 GENERAL PHARMACY W/ HCPCS (ALT 636 FOR OP/ED)

## 2024-02-05 PROCEDURE — 80069 RENAL FUNCTION PANEL: CPT

## 2024-02-05 PROCEDURE — 88305 TISSUE EXAM BY PATHOLOGIST: CPT | Mod: TC,SUR | Performed by: INTERNAL MEDICINE

## 2024-02-05 PROCEDURE — C9113 INJ PANTOPRAZOLE SODIUM, VIA: HCPCS

## 2024-02-05 PROCEDURE — 88305 TISSUE EXAM BY PATHOLOGIST: CPT | Performed by: PATHOLOGY

## 2024-02-05 PROCEDURE — 2720000007 HC OR 272 NO HCPCS: Performed by: INTERNAL MEDICINE

## 2024-02-05 PROCEDURE — 99232 SBSQ HOSP IP/OBS MODERATE 35: CPT

## 2024-02-05 PROCEDURE — A43239 PR EDG TRANSORAL BIOPSY SINGLE/MULTIPLE: Performed by: ANESTHESIOLOGIST ASSISTANT

## 2024-02-05 PROCEDURE — 88342 IMHCHEM/IMCYTCHM 1ST ANTB: CPT | Performed by: PATHOLOGY

## 2024-02-05 PROCEDURE — 3E0G8GC INTRODUCTION OF OTHER THERAPEUTIC SUBSTANCE INTO UPPER GI, VIA NATURAL OR ARTIFICIAL OPENING ENDOSCOPIC: ICD-10-PCS | Performed by: INTERNAL MEDICINE

## 2024-02-05 PROCEDURE — 88341 IMHCHEM/IMCYTCHM EA ADD ANTB: CPT | Performed by: PATHOLOGY

## 2024-02-05 RX ORDER — PROPOFOL 10 MG/ML
INJECTION, EMULSION INTRAVENOUS CONTINUOUS PRN
Status: DISCONTINUED | OUTPATIENT
Start: 2024-02-05 | End: 2024-02-05

## 2024-02-05 RX ORDER — MIDAZOLAM HYDROCHLORIDE 1 MG/ML
INJECTION, SOLUTION INTRAMUSCULAR; INTRAVENOUS AS NEEDED
Status: DISCONTINUED | OUTPATIENT
Start: 2024-02-05 | End: 2024-02-05

## 2024-02-05 RX ORDER — FENTANYL CITRATE 50 UG/ML
INJECTION, SOLUTION INTRAMUSCULAR; INTRAVENOUS AS NEEDED
Status: DISCONTINUED | OUTPATIENT
Start: 2024-02-05 | End: 2024-02-05

## 2024-02-05 RX ORDER — PANTOPRAZOLE SODIUM 40 MG/1
40 TABLET, DELAYED RELEASE ORAL
Status: DISCONTINUED | OUTPATIENT
Start: 2024-02-05 | End: 2024-02-06 | Stop reason: HOSPADM

## 2024-02-05 RX ORDER — MIDAZOLAM HYDROCHLORIDE 1 MG/ML
INJECTION INTRAMUSCULAR; INTRAVENOUS AS NEEDED
Status: DISCONTINUED | OUTPATIENT
Start: 2024-02-05 | End: 2024-02-05

## 2024-02-05 RX ORDER — LIDOCAINE HYDROCHLORIDE 20 MG/ML
INJECTION, SOLUTION INFILTRATION; PERINEURAL AS NEEDED
Status: DISCONTINUED | OUTPATIENT
Start: 2024-02-05 | End: 2024-02-05

## 2024-02-05 RX ADMIN — MIDAZOLAM HYDROCHLORIDE 2 MG: 1 INJECTION, SOLUTION INTRAMUSCULAR; INTRAVENOUS at 14:17

## 2024-02-05 RX ADMIN — FENTANYL CITRATE 50 MCG: 50 INJECTION, SOLUTION INTRAMUSCULAR; INTRAVENOUS at 14:17

## 2024-02-05 RX ADMIN — PANTOPRAZOLE SODIUM 40 MG: 40 TABLET, DELAYED RELEASE ORAL at 17:56

## 2024-02-05 RX ADMIN — SODIUM CHLORIDE 75 ML/HR: 9 INJECTION, SOLUTION INTRAVENOUS at 05:00

## 2024-02-05 RX ADMIN — FENTANYL CITRATE 50 MCG: 50 INJECTION, SOLUTION INTRAMUSCULAR; INTRAVENOUS at 14:18

## 2024-02-05 RX ADMIN — SODIUM CHLORIDE, SODIUM LACTATE, POTASSIUM CHLORIDE, AND CALCIUM CHLORIDE: 600; 310; 30; 20 INJECTION, SOLUTION INTRAVENOUS at 14:16

## 2024-02-05 RX ADMIN — PANTOPRAZOLE SODIUM 40 MG: 40 INJECTION, POWDER, FOR SOLUTION INTRAVENOUS at 09:08

## 2024-02-05 RX ADMIN — Medication 5 MG: at 22:17

## 2024-02-05 RX ADMIN — PROPOFOL 200 MCG/KG/MIN: 10 INJECTION, EMULSION INTRAVENOUS at 14:17

## 2024-02-05 RX ADMIN — LIDOCAINE HYDROCHLORIDE 100 MG: 20 INJECTION, SOLUTION INFILTRATION; PERINEURAL at 14:17

## 2024-02-05 ASSESSMENT — COGNITIVE AND FUNCTIONAL STATUS - GENERAL
DAILY ACTIVITIY SCORE: 24
MOBILITY SCORE: 24

## 2024-02-05 ASSESSMENT — PAIN SCALES - GENERAL
PAINLEVEL_OUTOF10: 0 - NO PAIN

## 2024-02-05 ASSESSMENT — PAIN - FUNCTIONAL ASSESSMENT
PAIN_FUNCTIONAL_ASSESSMENT: 0-10

## 2024-02-05 NOTE — CARE PLAN
Problem: Pain  Goal: My pain/discomfort is manageable  Outcome: Progressing     Problem: Safety  Goal: Patient will be injury free during hospitalization  Outcome: Progressing  Goal: I will remain free of falls  Outcome: Progressing     Problem: Daily Care  Goal: Daily care needs are met  Outcome: Progressing     Problem: Psychosocial Needs  Goal: Demonstrates ability to cope with hospitalization/illness  Outcome: Progressing  Goal: Collaborate with me, my family, and caregiver to identify my specific goals  Outcome: Progressing     Problem: Discharge Barriers  Goal: My discharge needs are met  Outcome: Progressing

## 2024-02-05 NOTE — ANESTHESIA PREPROCEDURE EVALUATION
Patient: Quincy Lee    Procedure Information       Date/Time: 02/05/24 0900    Scheduled providers: Ilda Meza MD; Daly Mchugh MD    Procedure: EGD    Location: [facility]        ALLERGIES:  No Known Allergies     MEDICAL HISTORY:  History reviewed. No pertinent past medical history.     Relevant Problems   GI   (+) GI bleed      Infectious Disease   (+) Flu        SURGICAL HISTORY:  History reviewed. No pertinent surgical history.     VITALS:      2/5/2024    11:10 AM 2/5/2024     5:18 AM 2/4/2024     8:47 PM   Vitals   Systolic 117 101 104   Diastolic 74 68 61   Heart Rate 86 90 89   Temp 36.4 °C (97.5 °F) 36.1 °C (97 °F) 36.1 °C (97 °F)   Resp 16 17 17       LABS:   BMP   Lab Results   Component Value Date    GLUCOSE 90 02/05/2024    CALCIUM 8.6 02/05/2024     (L) 02/05/2024    K 4.0 02/05/2024    CO2 24 02/05/2024     02/05/2024    BUN 9 02/05/2024    CREATININE 0.57 02/05/2024   , CBC  Lab Results   Component Value Date    WBC 5.0 02/05/2024    HGB 7.3 (L) 02/05/2024    HCT 21.1 (L) 02/05/2024    MCV 87 02/05/2024     02/05/2024          , Coags   Lab Results   Component Value Date/Time    PROTIME 12.9 (H) 02/04/2024 0650    INR 1.1 02/04/2024 0650    APTT 29 02/04/2024 0650        IMAGES:  EKG          Encounter Date: 01/30/24   ECG 12 Lead   Result Value    Ventricular Rate 107    Atrial Rate 107    TN Interval 154    QRS Duration 100    QT Interval 354    QTC Calculation(Bazett) 472    P Axis 74    R Axis 40    T Axis 70    QRS Count 17    Q Onset 217    P Onset 140    P Offset 200    T Offset 394    QTC Fredericia 429    Narrative    Sinus tachycardia  Voltage criteria for left ventricular hypertrophy  Nonspecific T wave abnormality  Abnormal ECG  When compared with ECG of 30-JAN-2024 09:56,  ST no longer elevated in Anterior leads  Nonspecific T wave abnormality now evident in Inferior leads  Nonspecific T wave abnormality, worse in Anterolateral leads       , ECHO         Transthoracic Echo (TTE) Limited With Doppler And Color 02/02/2024    [facility], [address] [place] [address]  Tel [phone] and Fax [phone]    TRANSTHORACIC ECHOCARDIOGRAM REPORT      Patient Name:      KARINA LARSEN         Zoila Physician:    [id number] Alise Chanel MD  Study Date:        2/2/2024             Ordering Provider:    [id number] FEDERICO FERRARO  MRN/PID:           [id number]             Fellow:  Accession#:        [id number]         Nurse:  Date of Birth/Age: 1980 / 43      Sonographer:          Cheryl Garcia RCS  years  Gender:            M                    Additional Staff:  Height:            182.00 cm            Admit Date:  Weight:            64.86 kg             Admission Status:     Inpatient -  Routine  BSA:               1.84 m2              Encounter#:           [id number]  Department Location:  [place] [address]  Blood Pressure: 118 /74 mmHg    Study Type:    TRANSTHORACIC ECHO (TTE) LIMITED  Diagnosis/ICD: Acute myocarditis, unspecified-I40.9  Indication:    Acute myocarditis  CPT Code:      Echo Limited-99364; Doppler Limited-53046; Color Doppler-98340    Patient History:  Pertinent History: COPD and HTN.    Study Detail: The following Echo studies were performed: 2D, M-Mode, Doppler and  color flow.      PHYSICIAN INTERPRETATION:  Left Ventricle: The left ventricular systolic function is mildly to moderately decreased, with an estimated ejection fraction of 40%. The left ventricular cavity size is mildly dilated. Left ventricular diastolic filling was indeterminate.  Left Atrium: The left atrium is normal in size.  Right Ventricle: The right ventricle is mildly enlarged. There is mildly reduced right ventricular systolic function.  Right Atrium: The right atrium is normal in size.  Aortic Valve: The aortic valve is trileaflet. There is mild aortic valve regurgitation.  Mitral Valve: The mitral valve is normal in  structure. There is trace mitral valve regurgitation.  Tricuspid Valve: The tricuspid valve is structurally normal. There is trace tricuspid regurgitation.  Pulmonic Valve: The pulmonic valve is structurally normal. There is physiologic pulmonic valve regurgitation.  Pericardium: There is no pericardial effusion noted.  Aorta: The aortic root is normal. There is no dilatation of the ascending aorta.  Systemic Veins: The inferior vena cava appears to be of normal size. There is IVC inspiratory collapse greater than 50%.  In comparison to the previous echocardiogram(s): Comparable to the prior echocardiogram from 01/2024.      CONCLUSIONS:  1. Left ventricular systolic function is mildly to moderately decreased with a 40% estimated ejection fraction.  2. There is mildly reduced right ventricular systolic function.  3. Mild aortic valve regurgitation.    QUANTITATIVE DATA SUMMARY:  2D MEASUREMENTS:  Normal Ranges:  Ao Root d:     3.40 cm    (2.0-3.7cm)  LAs:           2.90 cm    (2.7-4.0cm)  IVSd:          0.90 cm    (0.6-1.1cm)  LVPWd:         1.00 cm    (0.6-1.1cm)  LVIDd:         6.20 cm    (3.9-5.9cm)  LVIDs:         4.90 cm  LV Mass Index: 132.8 g/m2  LV % FS        21.0 %    LA VOLUME:  Normal Ranges:  LA Vol A4C:        44.2 ml    (22+/-6mL/m2)  LA Vol A2C:        50.0 ml  LA Vol BP:         48.1 ml  LA Vol Index A4C:  24.0ml/m2  LA Vol Index A2C:  27.1 ml/m2  LA Vol Index BP:   26.1 ml/m2  LA Area A4C:       15.5 cm2  LA Area A2C:       16.1 cm2  LA Major Axis A4C: 4.6 cm  LA Major Axis A2C: 4.4 cm  LA Volume Index:   26.1 ml/m2    RA VOLUME BY A/L METHOD:  Normal Ranges:  RA Vol A4C:        39.1 ml    (8.3-19.5ml)  RA Vol Index A4C:  21.2 ml/m2  RA Area A4C:       12.9 cm2  RA Major Axis A4C: 3.6 cm    AORTA MEASUREMENTS:  Normal Ranges:  Asc Ao, d: 2.80 cm (2.1-3.4cm)    LV SYSTOLIC FUNCTION BY 2D PLANIMETRY (MOD):  Normal Ranges:  EF-A4C View: 44.1 % (>=55%)  EF-A2C View: 33.8 %  EF-Biplane:  39.6 %    LV  DIASTOLIC FUNCTION:  Normal Ranges:  MV e'         0.10 m/s (>8.0)  MV lateral e' 0.12 m/s  MV medial e'  0.08 m/s    AORTIC VALVE:  Normal Ranges:  LVOT Diameter: 2.10 cm (1.8-2.4cm)      RIGHT VENTRICLE:  RV Basal 3.85 cm  RV Mid   2.90 cm  RV Major 7.6 cm  TAPSE:   14.7 mm  RV s'    0.10 m/s     , CXR       XR chest 1 view 01/30/2024    Narrative  Interpreted By:  Jv Bhakta and Liller Gregory  STUDY:  XR CHEST 1 VIEW;  1/30/2024 10:20 pm    INDICATION:  Signs/Symptoms:myocarditis, flu pneumonia and decreased systolic  function.    COMPARISON:  CT chest 01/30/2024    ACCESSION NUMBER(S):  [id number]    ORDERING CLINICIAN:  PEPPER MCGOVERN    FINDINGS:  AP radiograph of the chest was provided.    CARDIOMEDIASTINAL SILHOUETTE:  Cardiomediastinal silhouette is normal in size and configuration.    LUNGS:  Redemonstration of multifocal patchy airspace opacities as the marked  with arrows on PACs within the right perihilar region and left lower  lung which are better appreciated on CT chest from 01/30/2024. No  pneumothorax or pleural effusion.    ABDOMEN:  No remarkable upper abdominal findings.    BONES:  No osseous injury.    Impression  Similar multifocal airspace opacities concerning for multifocal  pneumonia when compared to CT chest from 01/30/2024. No pneumothorax  or pleural effusion. Continued follow-up to resolution is recommended.    I personally reviewed the images/study and I agree with the findings  as stated above by resident physician, Dr. Bartolo Pozo. The  study was interpreted at [facility] in [facility].    MACRO:  none.    Signed by: Jv Bhakta 1/31/2024 9:30 AM  Dictation workstation:   [id number]    , CT Head/Neck       CT head wo IV contrast 01/31/2024    Narrative  Interpreted By:  Ashlyn Zarate,  STUDY:  CT HEAD WO IV CONTRAST;  1/31/2024 7:24 am    INDICATION:  Signs/Symptoms:fall with  headstrike.    COMPARISON:  None.    ACCESSION NUMBER(S):  [id number]    ORDERING CLINICIAN:  PEPPER MCGOVERN    TECHNIQUE:  Noncontrast axial CT scan of head was performed. Angled reformats in  brain and bone windows were generated. The images were reviewed in  bone, brain, blood and soft tissue windows.    FINDINGS:  CSF Spaces: The ventricles, sulci and basal cisterns are within  normal limits. There is no extraaxial fluid collection.    Parenchyma:  The grey-white differentiation is intact. There is no  mass effect or midline shift.  There is no intracranial hemorrhage.    Calvarium: The calvarium is unremarkable. Multilobulated cystic  appearing scalp lesion within the right temporal region may represent  a sebaceous cyst.    Paranasal sinuses and mastoids: Visualized paranasal sinuses and  mastoids are predominantly clear. Mucous retention cyst versus polyp  within the left maxillary sinus.    Impression  No evidence of intracranial hemorrhage or displaced skull fracture.    MACRO:  None    Signed by: Ashlyn Zarate 1/31/2024 8:00 AM  Dictation workstation:   [id number]    , CT Chest      No results found for this or any previous visit from the past 730 days.   , CT Abdomin     No results found for this or any previous visit from the past 730 days.    SOCIAL:  Social History     Tobacco Use   Smoking Status Never    Passive exposure: Never   Smokeless Tobacco Never      Social History     Substance and Sexual Activity   Alcohol Use None      Social History     Substance and Sexual Activity   Drug Use Not on file        NPO STATUS:  NPO/Void Status  Date of Last Liquid: 02/04/24  Time of Last Liquid: 2300  Date of Last Solid: 02/04/24  Time of Last Solid: 2300        Clinical Areas Reviewed:   Tobacco  Allergies  Meds   Med Hx  Surg Hx   Fam Hx  Soc Hx      Physical Exam    Airway  TM distance: >3 FB  Neck ROM: full     Cardiovascular - normal exam     Dental    Pulmonary - normal exam     Abdominal -  normal exam             Anesthesia Plan    History of general anesthesia?: yes  History of complications of general anesthesia?: no    ASA 3     MAC     intravenous induction   Postoperative administration of opioids is intended.  Anesthetic plan and risks discussed with patient.  Use of blood products discussed with patient who.    Plan discussed with CAA and attending.

## 2024-02-05 NOTE — PROGRESS NOTES
Transitional Care Coordination Progress Note:  Patient discussed during interdisciplinary rounds.   Team members present: MD AGATA  Plan per medical/surgical team: Pt with melena-EGD today.  Payer: [employer]   Status: inpatient  Discharge disposition: Home   Potential Barriers: none   ADOD: 2/6

## 2024-02-05 NOTE — PROGRESS NOTES
Quincy Lee is a 43 y.o. male on day 6 of admission presenting with Flu.    Subjective   NAEO.    Patient seen this morning resting comfortably in bed. Pt is NPO, but has been tolerating PO intake and denies n/v and abdominal pain. Pt is voiding spontaneously and denies dysuria, hematuria, and suprapubic pain/fullness. Pt is stooling without issue. Last  BM yesterday, still melanotic. Pt denies fevers/chills, chest pain, lightheadedness/dizziness, palpitations, and SOB/cough.    No further questions, concerns, or complaints at this time.      Objective   24 Hour Vitals  Temp:  [36.1 °C (97 °F)-36.4 °C (97.5 °F)] 36.2 °C (97.2 °F)  Heart Rate:  [81-90] 89  Resp:  [16-20] 17  BP: (101-137)/() 121/72    Temp (24hrs), Av.2 °C (97.2 °F), Min:36.1 °C (97 °F), Max:36.4 °C (97.5 °F)     24 hour Intake/Output    Intake/Output Summary (Last 24 hours) at 2024 1704  Last data filed at 2024 0910  Gross per 24 hour   Intake --   Output 1550 ml   Net -1550 ml        Exam:  General: Resting comfortably in bed. Well developed, well nourished. Appears stated age. In no acute distress   HEENT: Normocephalic and atraumatic. EOMI, sclera non-icteric, MMM, no JVD  Cardiovascular: RRR, no r/m/g  Pulmonary: Lungs course bilaterally. On RA, does not appear dyspneic   GI: +BS, soft, non-tender, non-distended  : No suprapubic/ flank pain  Extremities: No LE edema   Skin: warm and dry. No rashes or lesions   Neurologic: CN II-XII grossly intact. No focal neurologic deficit   Psych: Pleasant. Appropriate mood and affect     Labs  CBC  Results from last 72 hours   Lab Units 24  0828 24  0650 24  0842   WBC AUTO x10*3/uL 5.0 5.7 5.2   HEMOGLOBIN g/dL 7.3* 7.6* 6.9*   HEMATOCRIT % 21.1* 21.7* 20.4*   PLATELETS AUTO x10*3/uL 373 216 150        BMP  Results from last 72 hours   Lab Units 24  0828 24  0650 24  0842   SODIUM mmol/L 135* 135* 133*   POTASSIUM mmol/L 4.0 3.8 3.4*   CHLORIDE mmol/L  104 105 105   BUN mg/dL 9 15 28*   CREATININE mg/dL 0.57 0.78 0.65   MAGNESIUM mg/dL 1.87 1.81 1.67   PHOSPHORUS mg/dL 3.3 2.7 2.2*       Medications   Scheduled Medications  melatonin, 5 mg, oral, Nightly  metoprolol succinate XL, 12.5 mg, oral, Daily  pantoprazole, 40 mg, oral, BID AC     Continuous Medications    PRN Medications  PRN medications: acetaminophen, albuterol, HYDROmorphone, ipratropium-albuteroL        Assessment/Plan   Principal Problem:    Flu  Active Problems:    Acute insomnia    GI bleed    Mr. Lee is a 43-year-old male with past medical history of asthma who was initially admitted to the MICU for influenza pneumonia and myocarditis with hemodynamic instability and evidence of cardiac dysfunction and hypoperfusion. Course c/b melanotic stools, c/f LGIB. Plan to pursue EGD with Gastroenterology today.      Updates (2/5):  -EGD today, follow up results/ GI recommendations   - Restart Metoprolol 12.5 mg every day   - Discontinue maintenance fluids         #Flu A infection with viral vs atypical pneumonia, resolved   :: CT PE 01/30/2024- No PE; however, Extensive diffuse bilateral pneumonia sparing the left upper lobe. Extensive bronchiectasis. Reactive mediastinal and hilar lymphadenopathy. Likely representing a viral pneumonia versus less likely atypical bacterial pneumonia.  :: CXR (01/2024)- multifocal airspace opacities concerning for multifocal pneumonia  :: Blood Cx  01/30/2024 x2 with Corynebacterium afermentas group   02/02/2024 x2- NGTD at 3 days   :: MRSA PCR negative, Legionella/ Strep penumo negative   :: Resp Cx with normal throat ramakrishna   :: Procal 5.02  ::Flu A positive   :: s/p ceftriaxone (01/30-02/01) , azithromycin (01/30- 01/31), Tamiflu (01/30-02/04)  Plan:  - Weaned to room air     #Possible upper GI bleed  :: patient with  black starry stools,Patient had soft blood pressure 2/3 with a Hgb drop to 6.9 s/p 1U of PRBC.   Plan:  - GI following, appreciate recommendations   -  EGD today  - C/w PPI BID         #h/o asthma with mild viral induced asthma exacerbation  :: No formal PFTs on file   :: Per outpatient PCP note 12/2021- office spirometry consistent with obstructive respiratory pattern. Previously on albuterol nebs/inhaler and Symbicort   Plan:  -albuterol PRNq6 ordered for wheezing/dyspnea   -  Recommend outpatient pulm follow up     #viral myocarditis with reduced EF  :: Troponin trend: 173>177>174>166>179>142>89  :: >113  :: TTE 01/2024- LVEF 40-45%, global hypokinesis of LV with minor regional variations, mildly reduced RV systolic function, mild to moderate AVR   :: TTE limited 02/2024- LVED 40%,  mildly reduced RV systolic function  :: Cardiology consulted recommending:   -Get repeat limited TTE once pt out of flu precautions (vs day prior to discharge) to reevaluate EF, if EF is still <50%, please start low dose entresto 12/13 bid (given low normal Bps) and metop succ 12.5 daily for GDMT  -Cardiology f/u on discharge regardless  -Can consider stress cMRI, likely as an outpt, which would clarify if ischemia (unlikely) or myocarditis may be present  Plan:  - c/w metoprolol 12.5 mg every day   - Consider starting Entresto inpatient vs outpatient   - Ensure cardiology follow up on discharge     #Hyponatremia, resolved   -122 on arrival, uptrended to 124, 122 ,123,126,135  -likely due to hypovolemia and insensible losses   -normal mentation, no indication for hypertonic saline   -will continue to trend  - RFPs Daily        #SURENDRA, resolved   -Initial creatinine around 3.0 from a baseline of normal.  After fluid administration, creatinine down trended to the low twos.  -Likely prerenal in etiology given poor p.o. intake, insensible losses and diminished cardiac output.  -Strict I's and O's     #hypotension, resolved   -Likely hypovolemic as patient has been normotensive since fluid resuscitation in the ED.  -POCUS on arrival to MICU demonstrated A completely collapsible, very  thin IVC suggesting that patient still has room for fluids.  -500CC crystalloid bolus on arrival to MICU      #hypokalemia, resolved    -Potassium 2.9 on arrival to MICU, 3.4 (1/31)  -Again likely due to dehydration,insensible losses and poor p.o. intake  -Potassium replenishment ordered     F: PRN  E: PRN  GI: Not indicated   A: PIV  DVT ppx: Heparin   O2: room air  Code Status: Full code    Henry Boston MD

## 2024-02-05 NOTE — PROGRESS NOTES
[facility]  [facility]  CONSULT FOLLOW-UP         SUBJECTIVE     Reason for Consult: Acute blood loss anemia     Interval Events/Subjective:   -NAOE, pt afebrile and HDS  -His last bowel movements was yesterday evening and was more formed. Continues to report dark stool. Denies abdominal, nausea, vomiting, fever or chills.   -Hgb 7.3 (from 7.6)    ROS: Complete ROS obtained, negative unless otherwise indicated above.     Medications:  melatonin, 5 mg, oral, Nightly  [Held by provider] metoprolol succinate XL, 12.5 mg, oral, Daily  oseltamivir, 75 mg, oral, BID  pantoprazole, 40 mg, intravenous, BID      PRN medications: acetaminophen, albuterol, HYDROmorphone, ipratropium-albuteroL      EXAM     Physical Exam   Vitals:    02/05/24 1619   BP: 121/72   Pulse: 89   Resp:    Temp:    SpO2: 94%     General: NAD, AA&O x 3  Eyes: EOMI, PERRLA  ENT: MMM  Heart: RRR  Lungs: No respiratory distress  Abdomen: Soft, non tender, non distended  Skin: No jaundice   Neuro: Appropriately responds to questions/commands         DATA                                                                            Labs     Lab Results   Component Value Date    WBC 5.7 02/04/2024    HGB 7.6 (L) 02/04/2024    HCT 21.7 (L) 02/04/2024    MCV 88 02/04/2024     02/04/2024     Lab Results   Component Value Date    GLUCOSE 95 02/04/2024    CALCIUM 8.5 (L) 02/04/2024     (L) 02/04/2024    K 3.8 02/04/2024    CO2 24 02/04/2024     02/04/2024    BUN 15 02/04/2024    CREATININE 0.78 02/04/2024     Lab Results   Component Value Date    ALT 15 02/04/2024    AST 38 02/04/2024    ALKPHOS 50 02/04/2024    BILITOT 0.4 02/04/2024     Lab Results   Component Value Date    INR 1.1 02/04/2024    INR 1.1 01/30/2024    INR 1.2 (H) 01/30/2024    PROTIME 12.9 (H) 02/04/2024    PROTIME 12.3 01/30/2024    PROTIME 13.5 (H) 01/30/2024                                                                               Imaging           === 01/30/24 ===    XR CHEST 1 VIEW    - Impression -  Similar multifocal airspace opacities concerning for multifocal  pneumonia when compared to CT chest from 01/30/2024. No pneumothorax  or pleural effusion. Continued follow-up to resolution is recommended.    I personally reviewed the images/study and I agree with the findings  as stated above by resident physician, Dr. Bartolo Pozo. The  study was interpreted at [facility] in [facility].    MACRO:  none.    Signed by: Jv Bhakta 1/31/2024 9:30 AM  Dictation workstation:   [id number]    === 01/30/24 ===    CT HEAD WO IV CONTRAST    - Impression -  No evidence of intracranial hemorrhage or displaced skull fracture.    MACRO:  None    Signed by: Ashlyn Zarate 1/31/2024 8:00 AM  Dictation workstation:   [id number]                                                                             GI Procedures           ASSESSMENT / PLAN     Assessment and Recommendations:   Quincy Lee is a 43 y.o. male w/PMH of asthma p/w flulike illness, admitted to MICU for influenza PNA (+ 1/30) and myocarditis with decreased EF 40~45% and global hypokinesis (Trop peaked at 177). Patient developed Hb drop anemia of ~7 in 2 days and elevation UN with new dark stools for which GI consulted.     #Dark stool  #Hb drop anemia  #Viral myocarditis with decreased EF 40~45% and global hypokinesis (Trop peaked at 177)  ::Developed melena.   ::Hb dropped ~7 in 2 days; 13.3 (2/1) --> 9.7 (2/2) -->6.9 (2/3) -->1U-->7.6 (2/4).   ::UN 25 on admission uptrended to 53.   -Suspicious for UGIB with differential include PUD, esophagitis/gastritis provoked by flu infection c/b myocarditis and PNA. Pt is currently saturating 100% on RA.     EGD (2/5):   The esophagus appeared normal. There were no findings of esophagitis, esophageal ulcer, varices or mass.  Z-line is regular and located 42 cm from the incisors.  A single, 10 mm,  round, cratered ulcer with nonbleeding visible vessel (Pavel IIA) was found in the incisura. Attempted to place hemostatic clips (4) but none were effective due to position difficulty with deployment. Injected 8 mL of epinephrine. No bleeding during or after intervention. Performed cold forceps biopsies.   Stomach exam otherwise normal. There were no other ulcers. Erosions, Dieulafoy's lesions, AVMs or gastric varices noted.   The duodenal bulb, 1st part of the duodenum and 2nd part of the duodenum appeared normal.      Plan  - Can resume diet   - Recommend PO PPI BID for two weeks followed by PPI daily   - Avoid NSAIDs, follow path to evaluate H pylori status       GONZALO per primary team     ------------------------------------------------------------------------  Liborio Alexander MD   Gastroenterology Fellow  After 5PM and on Weekends, please page on-call fellow.    To be discussed with service attending    Final recommendations pending attending attestation.

## 2024-02-06 ENCOUNTER — PHARMACY VISIT (OUTPATIENT)
Dept: PHARMACY | Facility: CLINIC | Age: 44
End: 2024-02-06
Payer: MEDICAID

## 2024-02-06 VITALS
TEMPERATURE: 98.6 F | SYSTOLIC BLOOD PRESSURE: 100 MMHG | WEIGHT: 145.94 LBS | DIASTOLIC BLOOD PRESSURE: 68 MMHG | OXYGEN SATURATION: 97 % | HEART RATE: 91 BPM | RESPIRATION RATE: 17 BRPM | HEIGHT: 72 IN | BODY MASS INDEX: 19.77 KG/M2

## 2024-02-06 LAB
ALBUMIN SERPL BCP-MCNC: 3.2 G/DL (ref 3.4–5)
ANION GAP SERPL CALC-SCNC: 11 MMOL/L (ref 10–20)
BACTERIA BLD CULT: NORMAL
BACTERIA BLD CULT: NORMAL
BASOPHILS # BLD AUTO: 0.02 X10*3/UL (ref 0–0.1)
BASOPHILS NFR BLD AUTO: 0.4 %
BUN SERPL-MCNC: 7 MG/DL (ref 6–23)
CALCIUM SERPL-MCNC: 8.8 MG/DL (ref 8.6–10.6)
CHLORIDE SERPL-SCNC: 99 MMOL/L (ref 98–107)
CO2 SERPL-SCNC: 29 MMOL/L (ref 21–32)
CREAT SERPL-MCNC: 0.74 MG/DL (ref 0.5–1.3)
EGFRCR SERPLBLD CKD-EPI 2021: >90 ML/MIN/1.73M*2
EOSINOPHIL # BLD AUTO: 0.05 X10*3/UL (ref 0–0.7)
EOSINOPHIL NFR BLD AUTO: 1 %
ERYTHROCYTE [DISTWIDTH] IN BLOOD BY AUTOMATED COUNT: 14.1 % (ref 11.5–14.5)
FERRITIN SERPL-MCNC: 380 NG/ML (ref 20–300)
GLUCOSE SERPL-MCNC: 91 MG/DL (ref 74–99)
HCT VFR BLD AUTO: 23.5 % (ref 41–52)
HGB BLD-MCNC: 8.2 G/DL (ref 13.5–17.5)
HGB RETIC QN: 32 PG (ref 28–38)
IMM GRANULOCYTES # BLD AUTO: 0.04 X10*3/UL (ref 0–0.7)
IMM GRANULOCYTES NFR BLD AUTO: 0.8 % (ref 0–0.9)
IMMATURE RETIC FRACTION: 44 %
LYMPHOCYTES # BLD AUTO: 1.87 X10*3/UL (ref 1.2–4.8)
LYMPHOCYTES NFR BLD AUTO: 35.7 %
MAGNESIUM SERPL-MCNC: 1.88 MG/DL (ref 1.6–2.4)
MCH RBC QN AUTO: 30.8 PG (ref 26–34)
MCHC RBC AUTO-ENTMCNC: 34.9 G/DL (ref 32–36)
MCV RBC AUTO: 88 FL (ref 80–100)
MONOCYTES # BLD AUTO: 0.75 X10*3/UL (ref 0.1–1)
MONOCYTES NFR BLD AUTO: 14.3 %
NEUTROPHILS # BLD AUTO: 2.51 X10*3/UL (ref 1.2–7.7)
NEUTROPHILS NFR BLD AUTO: 47.8 %
NRBC BLD-RTO: 0 /100 WBCS (ref 0–0)
PHOSPHATE SERPL-MCNC: 3.7 MG/DL (ref 2.5–4.9)
PLATELET # BLD AUTO: 568 X10*3/UL (ref 150–450)
POTASSIUM SERPL-SCNC: 4.6 MMOL/L (ref 3.5–5.3)
RBC # BLD AUTO: 2.66 X10*6/UL (ref 4.5–5.9)
RETICS #: 0.06 X10*6/UL (ref 0.02–0.12)
RETICS/RBC NFR AUTO: 2.1 % (ref 0.5–2)
SODIUM SERPL-SCNC: 134 MMOL/L (ref 136–145)
WBC # BLD AUTO: 5.2 X10*3/UL (ref 4.4–11.3)

## 2024-02-06 PROCEDURE — 83735 ASSAY OF MAGNESIUM: CPT

## 2024-02-06 PROCEDURE — 80069 RENAL FUNCTION PANEL: CPT

## 2024-02-06 PROCEDURE — 2500000004 HC RX 250 GENERAL PHARMACY W/ HCPCS (ALT 636 FOR OP/ED)

## 2024-02-06 PROCEDURE — 99238 HOSP IP/OBS DSCHRG MGMT 30/<: CPT

## 2024-02-06 PROCEDURE — 2500000001 HC RX 250 WO HCPCS SELF ADMINISTERED DRUGS (ALT 637 FOR MEDICARE OP)

## 2024-02-06 PROCEDURE — RXMED WILLOW AMBULATORY MEDICATION CHARGE

## 2024-02-06 PROCEDURE — 36415 COLL VENOUS BLD VENIPUNCTURE: CPT

## 2024-02-06 PROCEDURE — 85025 COMPLETE CBC W/AUTO DIFF WBC: CPT

## 2024-02-06 PROCEDURE — 85045 AUTOMATED RETICULOCYTE COUNT: CPT

## 2024-02-06 RX ORDER — METOPROLOL SUCCINATE 25 MG/1
12.5 TABLET, EXTENDED RELEASE ORAL DAILY
Qty: 15 TABLET | Refills: 1 | Status: SHIPPED | OUTPATIENT
Start: 2024-02-07 | End: 2024-04-07

## 2024-02-06 RX ORDER — PANTOPRAZOLE SODIUM 40 MG/1
40 TABLET, DELAYED RELEASE ORAL 2 TIMES DAILY
Qty: 60 TABLET | Refills: 0 | Status: SHIPPED | OUTPATIENT
Start: 2024-02-06 | End: 2024-04-06

## 2024-02-06 RX ADMIN — PANTOPRAZOLE SODIUM 40 MG: 40 TABLET, DELAYED RELEASE ORAL at 07:52

## 2024-02-06 RX ADMIN — METOPROLOL SUCCINATE 12.5 MG: 25 TABLET, EXTENDED RELEASE ORAL at 07:53

## 2024-02-06 ASSESSMENT — COGNITIVE AND FUNCTIONAL STATUS - GENERAL
DAILY ACTIVITIY SCORE: 24
MOBILITY SCORE: 24

## 2024-02-06 ASSESSMENT — PAIN SCALES - GENERAL: PAINLEVEL_OUTOF10: 0 - NO PAIN

## 2024-02-06 ASSESSMENT — PAIN - FUNCTIONAL ASSESSMENT: PAIN_FUNCTIONAL_ASSESSMENT: 0-10

## 2024-02-06 NOTE — CARE PLAN
The patient's goals for the shift include sleep    The clinical goals for the shift include Pt will have no c/o of pain or rate pain <4 this shift    Over the shift, the patient did not make progress toward the following goals. Barriers to progression include   . Recommendations to address these barriers include   .

## 2024-02-06 NOTE — CARE PLAN
The patient's goals for the shift include Maintain open communication from care team    The clinical goals for the shift include Go for diagnostic testing    Over the shift, the patient did not make progress toward the following goals. Barriers to progression include   . Recommendations to address these barriers include   .

## 2024-02-06 NOTE — DISCHARGE SUMMARY
Discharge Diagnosis  Flu    Test Results Pending At Discharge  Pending Labs       Order Current Status    Surgical Pathology Exam In process            Hospital Course  Quincy Lee is a 43-year-old male with past medical history of asthma who presented to ED with 2-3 days of flu like illness. On presentation afebrile with hypotension to 87/58 and tachycardia to 112 with O2 sats appropriate on RA. Initial workup revealing of leukocytosis to 17.9 with left shift, hyponatremia of 122, significant SURENDRA with a creatinine of 3.09 (up from normal baseline). Troponin elevation to 173 -> 177 -> 174 ->166->142.  BNP elevated at 236->113. Flu a positive. VBG Ph7.29/PCO2 40 lactate 5.3. EKG performed showed sinus tachycardia without ischemic changes.  CT PE with evidence of bilateral infiltrates consistent with a viral or atypical pneumonia. Blood cultures were obtained and the patient was started on azithromycin, Rocephin, and Tamiflu. He was given a 1 L fluid bolus with improvement in his lactate and creatinine.     Patient was evaluated by the CICU fellow who performed bedside echo revealing diminished systolic function with global hypokinesia (no associated pericardial effusion).  He was admitted to the MICU for influenza pneumonia and myocarditis with hemodynamic instability and evidence of cardiac dysfunction and hypoperfusion.    Formal echo revealed mildly decreased left ventricular systolic function at 40 to 45% estimated EF with global hypokinesis of left ventricle and mildly reduced right ventricular systolic function. He was formally evaluated by Cardiology who felt down trending troponin is inconsistent with active myocarditis and no signs on echo to suggest perimyocarditis save mildly reduced EF. Recommended initiating Metoprolol 12.5 mg every day with consideration of starting Entresto outpatient. Can consider stress cMRI, likely as an outpt, which would clarify if ischemia (unlikely) or myocarditis may be  present.    In the MICU continued to be stable on 4-5 L via NC satting well, his trop/BNP were trending down, and SURENDRA improved with IV fluid. As his oxygen requirements where stable and his hemodynamic instability improved with IVF he was deemed appropriate for GMF.    On arrival to floor he was on 5 L of nasal cannula, complaining of diarrhea, and bilateral calf pain. C.diff negative, BL LE US no acute DVT. He was weaned to room air. However, he developed melanotic stools associated with Hgb drop to 6.9 requiring 1 unit pRBC transfusion. Evaluated by GI and underwent EGD revealing of a single, 10 mm, round, cratered ulcer with nonbleeding visible vessel (Pavel IIA) was found in the incisura. Attempted to place hemostatic clips (4) but none were effective due to position difficulty with deployment. Injected 8 mL of epinephrine. No bleeding during or after intervention. Performed cold forceps biopsies, results pending on discharge. GI recommending PO PPI BID for 2 weeks then once daily thereafter. He was deemed medically stable for discharge following as his hemoglobin remained stable and he remained on RA.     To do:  [  ] follow up with GI  [  ] Follow up EGD Bx for H pylori   [  ] Follow up with Cardiology for consideration of Entresto and cMRI  [  ] follow up with pulm for formal PFT and asthma management     Vitals:    02/06/24 0751   BP: 100/68   Pulse: 91   Resp:    Temp:    SpO2:       Exam:  General: Resting comfortably in bed. Well developed, well nourished. Appears stated age. In no acute distress   HEENT: Normocephalic and atraumatic. EOMI, sclera non-icteric, MMM, no JVD  Cardiovascular: RRR, no r/m/g  Pulmonary: Lungs course bilaterally. On RA, does not appear dyspneic   GI: +BS, soft, non-tender, non-distended  : No suprapubic/ flank pain  Extremities: No LE edema   Skin: warm and dry. No rashes or lesions   Neurologic: CN II-XII grossly intact. No focal neurologic deficit   Psych: Pleasant.  Appropriate mood and affect         Home Medications     Medication List      START taking these medications     melatonin 5 mg tablet; Take 1 tablet (5 mg) by mouth as needed at   bedtime (insomina).   metoprolol succinate XL 25 mg 24 hr tablet; Commonly known as:   Toprol-XL; Take 0.5 tablets (12.5 mg) by mouth once daily. Do not crush or   chew. Do not start before February 7, 2024.; Start taking on: February 7, 2024   pantoprazole 40 mg EC tablet; Commonly known as: ProtoNix; Take 1 tablet   (40 mg) by mouth 2 times a day. Take 1 tablet by mouth TWICE daily   2/6-2/19. On 2/20 begin taking 1 tablet by mouth ONCE daily. Do not crush,   chew, or split. Take on an empty stomach. Do not eat for at least 30 min   after taking pill.       Outpatient Follow-Up  No future appointments.    Henry Boston MD

## 2024-02-06 NOTE — CARE PLAN
Problem: Pain  Goal: My pain/discomfort is manageable  Outcome: Progressing     Problem: Safety  Goal: Patient will be injury free during hospitalization  Outcome: Progressing  Goal: I will remain free of falls  Outcome: Progressing     Problem: Daily Care  Goal: Daily care needs are met  Outcome: Progressing   The patient's goals for the shift include Maintain open communication from care team    The clinical goals for the shift include Go for diagnostic testing    Over the shift, the patient did not make progress toward the following goals.

## 2024-02-08 NOTE — ANESTHESIA POSTPROCEDURE EVALUATION
Patient: Quincy Lee    Procedure Summary       Date: 02/05/24 Room / Location: [facility]    Anesthesia Start: 1416 Anesthesia Stop: 1456    Procedure: EGD Diagnosis: Gastrointestinal hemorrhage with melena    Scheduled Providers: Ilda Meza MD; Daly Mchugh MD Responsible Provider: Daly Mchugh MD    Anesthesia Type: MAC ASA Status: 3            Anesthesia Type: MAC    Vitals Value Taken Time   /100 02/05/24 1525   Temp 36.2 °C (97.2 °F) 02/05/24 1455   Pulse 83 02/05/24 1525   Resp 17 02/05/24 1525   SpO2 97 % 02/05/24 1525       Anesthesia Post Evaluation    Patient location during evaluation: PACU  Patient participation: complete - patient participated  Level of consciousness: awake and alert  Pain management: satisfactory to patient  Airway patency: patent  Cardiovascular status: acceptable  Respiratory status: acceptable  Hydration status: acceptable  Postoperative Nausea and Vomiting: none    No notable events documented.

## 2024-02-10 LAB
ATRIAL RATE: 107 BPM
P AXIS: 74 DEGREES
P OFFSET: 200 MS
P ONSET: 140 MS
PR INTERVAL: 154 MS
Q ONSET: 217 MS
QRS COUNT: 17 BEATS
QRS DURATION: 100 MS
QT INTERVAL: 354 MS
QTC CALCULATION(BAZETT): 472 MS
QTC FREDERICIA: 429 MS
R AXIS: 40 DEGREES
T AXIS: 70 DEGREES
T OFFSET: 394 MS
VENTRICULAR RATE: 107 BPM

## 2024-02-11 ENCOUNTER — HOSPITAL ENCOUNTER (EMERGENCY)
Facility: HOSPITAL | Age: 44
Discharge: HOME | End: 2024-02-11
Attending: EMERGENCY MEDICINE
Payer: MEDICAID

## 2024-02-11 ENCOUNTER — APPOINTMENT (OUTPATIENT)
Dept: RADIOLOGY | Facility: HOSPITAL | Age: 44
End: 2024-02-11
Payer: MEDICAID

## 2024-02-11 VITALS
HEIGHT: 71 IN | HEART RATE: 116 BPM | DIASTOLIC BLOOD PRESSURE: 71 MMHG | WEIGHT: 165 LBS | OXYGEN SATURATION: 97 % | TEMPERATURE: 98 F | SYSTOLIC BLOOD PRESSURE: 108 MMHG | BODY MASS INDEX: 23.1 KG/M2 | RESPIRATION RATE: 18 BRPM

## 2024-02-11 DIAGNOSIS — K59.00 CONSTIPATION, UNSPECIFIED CONSTIPATION TYPE: Primary | ICD-10-CM

## 2024-02-11 LAB
ALBUMIN SERPL BCP-MCNC: 3.6 G/DL (ref 3.4–5)
ALP SERPL-CCNC: 110 U/L (ref 33–120)
ALT SERPL W P-5'-P-CCNC: 10 U/L (ref 10–52)
ANION GAP BLDV CALCULATED.4IONS-SCNC: 9 MMOL/L (ref 10–25)
ANION GAP SERPL CALC-SCNC: 13 MMOL/L (ref 10–20)
AST SERPL W P-5'-P-CCNC: 26 U/L (ref 9–39)
BASE EXCESS BLDV CALC-SCNC: 4.9 MMOL/L (ref -2–3)
BASOPHILS # BLD AUTO: 0.02 X10*3/UL (ref 0–0.1)
BASOPHILS NFR BLD AUTO: 0.3 %
BILIRUB SERPL-MCNC: 0.4 MG/DL (ref 0–1.2)
BODY TEMPERATURE: 37 DEGREES CELSIUS
BUN SERPL-MCNC: 8 MG/DL (ref 6–23)
CA-I BLDV-SCNC: 1.21 MMOL/L (ref 1.1–1.33)
CALCIUM SERPL-MCNC: 9.6 MG/DL (ref 8.6–10.6)
CHLORIDE BLDV-SCNC: 99 MMOL/L (ref 98–107)
CHLORIDE SERPL-SCNC: 98 MMOL/L (ref 98–107)
CO2 SERPL-SCNC: 28 MMOL/L (ref 21–32)
CREAT SERPL-MCNC: 0.78 MG/DL (ref 0.5–1.3)
EGFRCR SERPLBLD CKD-EPI 2021: >90 ML/MIN/1.73M*2
EOSINOPHIL # BLD AUTO: 0.01 X10*3/UL (ref 0–0.7)
EOSINOPHIL NFR BLD AUTO: 0.1 %
ERYTHROCYTE [DISTWIDTH] IN BLOOD BY AUTOMATED COUNT: 14.1 % (ref 11.5–14.5)
GLUCOSE BLDV-MCNC: 110 MG/DL (ref 74–99)
GLUCOSE SERPL-MCNC: 111 MG/DL (ref 74–99)
HCO3 BLDV-SCNC: 29.9 MMOL/L (ref 22–26)
HCT VFR BLD AUTO: 27.7 % (ref 41–52)
HCT VFR BLD EST: 31 % (ref 41–52)
HGB BLD-MCNC: 9.7 G/DL (ref 13.5–17.5)
HGB BLDV-MCNC: 10.2 G/DL (ref 13.5–17.5)
IMM GRANULOCYTES # BLD AUTO: 0.08 X10*3/UL (ref 0–0.7)
IMM GRANULOCYTES NFR BLD AUTO: 1 % (ref 0–0.9)
LACTATE BLDV-SCNC: 1.2 MMOL/L (ref 0.4–2)
LYMPHOCYTES # BLD AUTO: 2.11 X10*3/UL (ref 1.2–4.8)
LYMPHOCYTES NFR BLD AUTO: 27.2 %
MCH RBC QN AUTO: 29 PG (ref 26–34)
MCHC RBC AUTO-ENTMCNC: 35 G/DL (ref 32–36)
MCV RBC AUTO: 83 FL (ref 80–100)
MONOCYTES # BLD AUTO: 0.74 X10*3/UL (ref 0.1–1)
MONOCYTES NFR BLD AUTO: 9.5 %
NEUTROPHILS # BLD AUTO: 4.81 X10*3/UL (ref 1.2–7.7)
NEUTROPHILS NFR BLD AUTO: 61.9 %
NRBC BLD-RTO: 0.3 /100 WBCS (ref 0–0)
OXYHGB MFR BLDV: 9.5 % (ref 45–75)
PCO2 BLDV: 45 MM HG (ref 41–51)
PH BLDV: 7.43 PH (ref 7.33–7.43)
PLATELET # BLD AUTO: 749 X10*3/UL (ref 150–450)
PO2 BLDV: 16 MM HG (ref 35–45)
POTASSIUM BLDV-SCNC: 3.9 MMOL/L (ref 3.5–5.3)
POTASSIUM SERPL-SCNC: 4.3 MMOL/L (ref 3.5–5.3)
PROT SERPL-MCNC: 8.2 G/DL (ref 6.4–8.2)
RBC # BLD AUTO: 3.34 X10*6/UL (ref 4.5–5.9)
SAO2 % BLDV: 10 % (ref 45–75)
SODIUM BLDV-SCNC: 134 MMOL/L (ref 136–145)
SODIUM SERPL-SCNC: 135 MMOL/L (ref 136–145)
WBC # BLD AUTO: 7.8 X10*3/UL (ref 4.4–11.3)

## 2024-02-11 PROCEDURE — 74021 RADEX ABDOMEN 3+ VIEWS: CPT | Mod: FOREIGN READ | Performed by: RADIOLOGY

## 2024-02-11 PROCEDURE — 36415 COLL VENOUS BLD VENIPUNCTURE: CPT | Performed by: EMERGENCY MEDICINE

## 2024-02-11 PROCEDURE — 74021 RADEX ABDOMEN 3+ VIEWS: CPT

## 2024-02-11 PROCEDURE — 85025 COMPLETE CBC W/AUTO DIFF WBC: CPT | Performed by: EMERGENCY MEDICINE

## 2024-02-11 PROCEDURE — 99284 EMERGENCY DEPT VISIT MOD MDM: CPT | Performed by: EMERGENCY MEDICINE

## 2024-02-11 PROCEDURE — 80053 COMPREHEN METABOLIC PANEL: CPT

## 2024-02-11 PROCEDURE — 84132 ASSAY OF SERUM POTASSIUM: CPT

## 2024-02-11 PROCEDURE — 82330 ASSAY OF CALCIUM: CPT

## 2024-02-11 PROCEDURE — 99283 EMERGENCY DEPT VISIT LOW MDM: CPT

## 2024-02-11 PROCEDURE — 84132 ASSAY OF SERUM POTASSIUM: CPT | Performed by: EMERGENCY MEDICINE

## 2024-02-11 ASSESSMENT — PAIN SCALES - GENERAL: PAINLEVEL_OUTOF10: 8

## 2024-02-11 ASSESSMENT — PAIN - FUNCTIONAL ASSESSMENT: PAIN_FUNCTIONAL_ASSESSMENT: 0-10

## 2024-02-11 ASSESSMENT — COLUMBIA-SUICIDE SEVERITY RATING SCALE - C-SSRS
1. IN THE PAST MONTH, HAVE YOU WISHED YOU WERE DEAD OR WISHED YOU COULD GO TO SLEEP AND NOT WAKE UP?: NO
6. HAVE YOU EVER DONE ANYTHING, STARTED TO DO ANYTHING, OR PREPARED TO DO ANYTHING TO END YOUR LIFE?: NO
2. HAVE YOU ACTUALLY HAD ANY THOUGHTS OF KILLING YOURSELF?: NO

## 2024-02-11 NOTE — DISCHARGE INSTRUCTIONS
Please continue taking the stool softener daily until you go back to having normal bowel movements.  You can also take the MiraLAX daily.  Drink plenty of fluids specifically water.  Return for any worsening abdominal pain, nausea or vomiting, or any other concerning symptoms

## 2024-02-11 NOTE — ED TRIAGE NOTES
Pt to ED with c/o lower abdominal pain with no bowel movement in 2 days. Pt was seen and admitted to MICU last week for myocarditis, hemodynamic instability, and flu/pneumonia. He state he tried an OTC stool softener with no successful bowel movement.     Hx of asthma, controlled. GI bleed

## 2024-02-12 LAB
LAB AP ASR DISCLAIMER: NORMAL
LABORATORY COMMENT REPORT: NORMAL
PATH REPORT.COMMENTS IMP SPEC: NORMAL
PATH REPORT.FINAL DX SPEC: NORMAL
PATH REPORT.GROSS SPEC: NORMAL
PATH REPORT.TOTAL CANCER: NORMAL

## 2024-02-28 DIAGNOSIS — K27.9 PEPTIC ULCER DISEASE: Primary | ICD-10-CM

## 2024-02-28 RX ORDER — ESOMEPRAZOLE MAGNESIUM 40 MG/1
40 CAPSULE, DELAYED RELEASE ORAL
Qty: 14 CAPSULE | Refills: 0 | Status: SHIPPED | OUTPATIENT
Start: 2024-02-28 | End: 2024-03-13

## 2024-02-28 RX ORDER — METRONIDAZOLE 250 MG/1
250 TABLET ORAL 4 TIMES DAILY
Qty: 56 TABLET | Refills: 0 | Status: SHIPPED | OUTPATIENT
Start: 2024-02-28 | End: 2024-03-13

## 2024-02-28 RX ORDER — TETRACYCLINE HYDROCHLORIDE 500 MG/1
500 CAPSULE ORAL 4 TIMES DAILY
Qty: 56 CAPSULE | Refills: 0 | Status: SHIPPED | OUTPATIENT
Start: 2024-02-28 | End: 2024-03-13

## 2024-02-28 NOTE — PROGRESS NOTES
Mr Lee was recently hospitalized with PNA and his hospital course was complicated by melena and acute blood loss anemia. He underwent and EGD (2/5) which showed a single 10 mm, round, cratered ulcer with non bleeding visible vessel (Pavel II a) located in the incisura. Clips and epinephrine injection were performed. 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 Following discussion with pathologist (Dr. Javier Zavala) about the histologic findings and the strong concern for H pylori infection, patient will be prescribed quadruple therapy. Patient in agreement with plan.

## 2024-11-14 ENCOUNTER — APPOINTMENT (OUTPATIENT)
Dept: PRIMARY CARE | Facility: CLINIC | Age: 44
End: 2024-11-14
Payer: MEDICAID

## 2024-12-19 ENCOUNTER — APPOINTMENT (OUTPATIENT)
Dept: PRIMARY CARE | Facility: CLINIC | Age: 44
End: 2024-12-19
Payer: MEDICAID

## 2025-01-26 ENCOUNTER — HOSPITAL ENCOUNTER (EMERGENCY)
Facility: HOSPITAL | Age: 45
Discharge: HOME | End: 2025-01-26
Payer: MEDICAID

## 2025-01-26 VITALS
HEIGHT: 71 IN | WEIGHT: 175 LBS | RESPIRATION RATE: 18 BRPM | DIASTOLIC BLOOD PRESSURE: 88 MMHG | OXYGEN SATURATION: 97 % | TEMPERATURE: 97.2 F | SYSTOLIC BLOOD PRESSURE: 152 MMHG | BODY MASS INDEX: 24.5 KG/M2 | HEART RATE: 82 BPM

## 2025-01-26 DIAGNOSIS — R19.5 DARK STOOLS: ICD-10-CM

## 2025-01-26 DIAGNOSIS — K59.00 CONSTIPATION, UNSPECIFIED CONSTIPATION TYPE: Primary | ICD-10-CM

## 2025-01-26 LAB
ABO GROUP (TYPE) IN BLOOD: NORMAL
ALBUMIN SERPL BCP-MCNC: 4.1 G/DL (ref 3.4–5)
ALP SERPL-CCNC: 88 U/L (ref 33–120)
ALT SERPL W P-5'-P-CCNC: 10 U/L (ref 10–52)
ANION GAP BLDV CALCULATED.4IONS-SCNC: 10 MMOL/L (ref 10–25)
ANION GAP SERPL CALC-SCNC: 16 MMOL/L (ref 10–20)
ANTIBODY SCREEN: NORMAL
APPEARANCE UR: CLEAR
APTT PPP: 35 SECONDS (ref 27–38)
AST SERPL W P-5'-P-CCNC: 25 U/L (ref 9–39)
BASE EXCESS BLDV CALC-SCNC: -0.2 MMOL/L (ref -2–3)
BASOPHILS # BLD AUTO: 0.03 X10*3/UL (ref 0–0.1)
BASOPHILS NFR BLD AUTO: 0.4 %
BILIRUB SERPL-MCNC: 0.5 MG/DL (ref 0–1.2)
BILIRUB UR STRIP.AUTO-MCNC: NEGATIVE MG/DL
BODY TEMPERATURE: 37 DEGREES CELSIUS
BUN SERPL-MCNC: 11 MG/DL (ref 6–23)
CA-I BLDV-SCNC: 1.14 MMOL/L (ref 1.1–1.33)
CALCIUM SERPL-MCNC: 9.2 MG/DL (ref 8.6–10.6)
CHLORIDE BLDV-SCNC: 104 MMOL/L (ref 98–107)
CHLORIDE SERPL-SCNC: 100 MMOL/L (ref 98–107)
CO2 SERPL-SCNC: 23 MMOL/L (ref 21–32)
COLOR UR: YELLOW
CREAT SERPL-MCNC: 0.65 MG/DL (ref 0.5–1.3)
EGFRCR SERPLBLD CKD-EPI 2021: >90 ML/MIN/1.73M*2
EOSINOPHIL # BLD AUTO: 0.03 X10*3/UL (ref 0–0.7)
EOSINOPHIL NFR BLD AUTO: 0.4 %
ERYTHROCYTE [DISTWIDTH] IN BLOOD BY AUTOMATED COUNT: 16.7 % (ref 11.5–14.5)
GLUCOSE BLDV-MCNC: 91 MG/DL (ref 74–99)
GLUCOSE SERPL-MCNC: 86 MG/DL (ref 74–99)
GLUCOSE UR STRIP.AUTO-MCNC: NORMAL MG/DL
HCO3 BLDV-SCNC: 24 MMOL/L (ref 22–26)
HCT VFR BLD AUTO: 36.2 % (ref 41–52)
HCT VFR BLD EST: 40 % (ref 41–52)
HGB BLD-MCNC: 12.8 G/DL (ref 13.5–17.5)
HGB BLDV-MCNC: 13.4 G/DL (ref 13.5–17.5)
IMM GRANULOCYTES # BLD AUTO: 0.06 X10*3/UL (ref 0–0.7)
IMM GRANULOCYTES NFR BLD AUTO: 0.9 % (ref 0–0.9)
INHALED O2 CONCENTRATION: 21 %
INR PPP: 1.2 (ref 0.9–1.1)
KETONES UR STRIP.AUTO-MCNC: NEGATIVE MG/DL
LACTATE BLDV-SCNC: 1.4 MMOL/L (ref 0.4–2)
LEUKOCYTE ESTERASE UR QL STRIP.AUTO: NEGATIVE
LIPASE SERPL-CCNC: 31 U/L (ref 9–82)
LYMPHOCYTES # BLD AUTO: 2.38 X10*3/UL (ref 1.2–4.8)
LYMPHOCYTES NFR BLD AUTO: 33.8 %
MAGNESIUM SERPL-MCNC: 1.82 MG/DL (ref 1.6–2.4)
MCH RBC QN AUTO: 29 PG (ref 26–34)
MCHC RBC AUTO-ENTMCNC: 35.4 G/DL (ref 32–36)
MCV RBC AUTO: 82 FL (ref 80–100)
MONOCYTES # BLD AUTO: 0.54 X10*3/UL (ref 0.1–1)
MONOCYTES NFR BLD AUTO: 7.7 %
NEUTROPHILS # BLD AUTO: 4.01 X10*3/UL (ref 1.2–7.7)
NEUTROPHILS NFR BLD AUTO: 56.8 %
NITRITE UR QL STRIP.AUTO: NEGATIVE
NRBC BLD-RTO: 0 /100 WBCS (ref 0–0)
OXYHGB MFR BLDV: 76.6 % (ref 45–75)
PCO2 BLDV: 37 MM HG (ref 41–51)
PH BLDV: 7.42 PH (ref 7.33–7.43)
PH UR STRIP.AUTO: 6 [PH]
PLATELET # BLD AUTO: 214 X10*3/UL (ref 150–450)
PO2 BLDV: 49 MM HG (ref 35–45)
POTASSIUM BLDV-SCNC: 4.1 MMOL/L (ref 3.5–5.3)
POTASSIUM SERPL-SCNC: 3.8 MMOL/L (ref 3.5–5.3)
PROT SERPL-MCNC: 9.1 G/DL (ref 6.4–8.2)
PROT UR STRIP.AUTO-MCNC: ABNORMAL MG/DL
PROTHROMBIN TIME: 13.2 SECONDS (ref 9.8–12.8)
RBC # BLD AUTO: 4.42 X10*6/UL (ref 4.5–5.9)
RBC # UR STRIP.AUTO: NEGATIVE /UL
RBC #/AREA URNS AUTO: NORMAL /HPF
RH FACTOR (ANTIGEN D): NORMAL
SAO2 % BLDV: 80 % (ref 45–75)
SODIUM BLDV-SCNC: 134 MMOL/L (ref 136–145)
SODIUM SERPL-SCNC: 135 MMOL/L (ref 136–145)
SP GR UR STRIP.AUTO: 1.02
UROBILINOGEN UR STRIP.AUTO-MCNC: ABNORMAL MG/DL
WBC # BLD AUTO: 7.1 X10*3/UL (ref 4.4–11.3)
WBC #/AREA URNS AUTO: NORMAL /HPF

## 2025-01-26 PROCEDURE — 81003 URINALYSIS AUTO W/O SCOPE: CPT

## 2025-01-26 PROCEDURE — 2500000004 HC RX 250 GENERAL PHARMACY W/ HCPCS (ALT 636 FOR OP/ED): Mod: SE

## 2025-01-26 PROCEDURE — 85610 PROTHROMBIN TIME: CPT

## 2025-01-26 PROCEDURE — 36415 COLL VENOUS BLD VENIPUNCTURE: CPT

## 2025-01-26 PROCEDURE — 96374 THER/PROPH/DIAG INJ IV PUSH: CPT

## 2025-01-26 PROCEDURE — 86900 BLOOD TYPING SEROLOGIC ABO: CPT

## 2025-01-26 PROCEDURE — 82435 ASSAY OF BLOOD CHLORIDE: CPT | Mod: 59

## 2025-01-26 PROCEDURE — 99284 EMERGENCY DEPT VISIT MOD MDM: CPT | Mod: 25

## 2025-01-26 PROCEDURE — 83735 ASSAY OF MAGNESIUM: CPT

## 2025-01-26 PROCEDURE — 2500000005 HC RX 250 GENERAL PHARMACY W/O HCPCS: Mod: SE

## 2025-01-26 PROCEDURE — 2500000001 HC RX 250 WO HCPCS SELF ADMINISTERED DRUGS (ALT 637 FOR MEDICARE OP): Mod: SE

## 2025-01-26 PROCEDURE — 85025 COMPLETE CBC W/AUTO DIFF WBC: CPT

## 2025-01-26 PROCEDURE — 82810 BLOOD GASES O2 SAT ONLY: CPT | Mod: CCI

## 2025-01-26 PROCEDURE — 83690 ASSAY OF LIPASE: CPT

## 2025-01-26 PROCEDURE — 99284 EMERGENCY DEPT VISIT MOD MDM: CPT

## 2025-01-26 RX ORDER — AMOXICILLIN 250 MG
1 CAPSULE ORAL DAILY
Qty: 20 TABLET | Refills: 0 | Status: SHIPPED | OUTPATIENT
Start: 2025-01-26 | End: 2025-02-15

## 2025-01-26 RX ORDER — POLYETHYLENE GLYCOL 3350 17 G/17G
17 POWDER, FOR SOLUTION ORAL DAILY
Qty: 10 PACKET | Refills: 0 | Status: SHIPPED | OUTPATIENT
Start: 2025-01-26 | End: 2025-02-05

## 2025-01-26 RX ORDER — ALUMINUM HYDROXIDE, MAGNESIUM HYDROXIDE, AND SIMETHICONE 1200; 120; 1200 MG/30ML; MG/30ML; MG/30ML
30 SUSPENSION ORAL ONCE
Status: COMPLETED | OUTPATIENT
Start: 2025-01-26 | End: 2025-01-26

## 2025-01-26 RX ORDER — PANTOPRAZOLE SODIUM 40 MG/10ML
80 INJECTION, POWDER, LYOPHILIZED, FOR SOLUTION INTRAVENOUS ONCE
Status: COMPLETED | OUTPATIENT
Start: 2025-01-26 | End: 2025-01-26

## 2025-01-26 RX ORDER — LIDOCAINE HYDROCHLORIDE 20 MG/ML
15 SOLUTION OROPHARYNGEAL ONCE
Status: COMPLETED | OUTPATIENT
Start: 2025-01-26 | End: 2025-01-26

## 2025-01-26 RX ORDER — ACETAMINOPHEN 325 MG/1
650 TABLET ORAL EVERY 6 HOURS PRN
Qty: 20 TABLET | Refills: 0 | Status: SHIPPED | OUTPATIENT
Start: 2025-01-26 | End: 2025-01-31

## 2025-01-26 RX ORDER — FAMOTIDINE 40 MG/1
40 TABLET, FILM COATED ORAL NIGHTLY PRN
Qty: 10 TABLET | Refills: 0 | Status: SHIPPED | OUTPATIENT
Start: 2025-01-26 | End: 2025-02-05

## 2025-01-26 RX ADMIN — PANTOPRAZOLE SODIUM 80 MG: 40 INJECTION, POWDER, FOR SOLUTION INTRAVENOUS at 15:04

## 2025-01-26 RX ADMIN — ALUMINUM HYDROXIDE, MAGNESIUM HYDROXIDE, AND SIMETHICONE 30 ML: 200; 200; 20 SUSPENSION ORAL at 15:04

## 2025-01-26 RX ADMIN — LIDOCAINE HYDROCHLORIDE 15 ML: 20 SOLUTION ORAL at 15:04

## 2025-01-26 ASSESSMENT — COLUMBIA-SUICIDE SEVERITY RATING SCALE - C-SSRS
6. HAVE YOU EVER DONE ANYTHING, STARTED TO DO ANYTHING, OR PREPARED TO DO ANYTHING TO END YOUR LIFE?: NO
2. HAVE YOU ACTUALLY HAD ANY THOUGHTS OF KILLING YOURSELF?: NO
1. IN THE PAST MONTH, HAVE YOU WISHED YOU WERE DEAD OR WISHED YOU COULD GO TO SLEEP AND NOT WAKE UP?: NO

## 2025-01-26 ASSESSMENT — PAIN DESCRIPTION - DESCRIPTORS: DESCRIPTORS: CRAMPING

## 2025-01-26 ASSESSMENT — PAIN SCALES - GENERAL: PAINLEVEL_OUTOF10: 7

## 2025-01-26 ASSESSMENT — PAIN DESCRIPTION - ORIENTATION: ORIENTATION: MID

## 2025-01-26 ASSESSMENT — PAIN - FUNCTIONAL ASSESSMENT: PAIN_FUNCTIONAL_ASSESSMENT: 0-10

## 2025-01-26 ASSESSMENT — PAIN DESCRIPTION - LOCATION: LOCATION: ABDOMEN

## 2025-01-26 NOTE — ED TRIAGE NOTES
Patient comes in endorsing constipation for the past 3 days; states that prior to the constipation, he was having black stools; denies any diarrhea at this point; states that he was here last year for the same thing and was found to have a bleeding ulcer, so he is concerned for that again; states he tried OTC stool softeners today with BM; denies NV; endorses mid-abdominal pain as well; denies light-headed, dizziness;     Denies any major medical hx  
BONY Douglas

## 2025-01-26 NOTE — ED PROVIDER NOTES
HPI   Chief Complaint   Patient presents with   • Constipation       44-year-old male with history of bleeding ulcer presents for chief complaint of constipation and dark stools.  States that he has been taking stool softeners but without much relief.  Endorses some generalized cramping but otherwise no specific pain.  Denies changes in urination.  No loose stools.  No nausea or vomiting.  He is passing gas.  Denies any rashes or swelling.  Denies fever, chills, myalgia.  Denies any pallor or bruising.              Patient History   No past medical history on file.  No past surgical history on file.  No family history on file.  Social History     Tobacco Use   • Smoking status: Never     Passive exposure: Never   • Smokeless tobacco: Never   Substance Use Topics   • Alcohol use: Not on file   • Drug use: Not on file       Physical Exam   ED Triage Vitals [01/26/25 1354]   Temperature Heart Rate Respirations BP   36.2 °C (97.2 °F) 91 18 (!) 138/91      Pulse Ox Temp Source Heart Rate Source Patient Position   97 % Temporal -- --      BP Location FiO2 (%)     -- --       Physical Exam  Vitals and nursing note reviewed.   Constitutional:       General: He is not in acute distress.     Appearance: He is well-developed.   HENT:      Head: Normocephalic and atraumatic.   Eyes:      Conjunctiva/sclera: Conjunctivae normal.   Cardiovascular:      Rate and Rhythm: Normal rate and regular rhythm.      Heart sounds: No murmur heard.  Pulmonary:      Effort: Pulmonary effort is normal. No respiratory distress.      Breath sounds: Normal breath sounds.   Abdominal:      Palpations: Abdomen is soft.      Tenderness: There is no abdominal tenderness. There is no right CVA tenderness or guarding. Negative signs include Womack's sign and McBurney's sign.   Musculoskeletal:         General: No swelling.      Cervical back: Neck supple.   Skin:     General: Skin is warm and dry.      Capillary Refill: Capillary refill takes less than  2 seconds.   Neurological:      Mental Status: He is alert.   Psychiatric:         Mood and Affect: Mood normal.           ED Course & MDM   Diagnoses as of 01/26/25 1610   Constipation, unspecified constipation type   Dark stools                 No data recorded     [place] Coma Scale Score: 15 (01/26/25 1411 : Milagros Jaimes, RN)                           Medical Decision Making  Vital signs reviewed, unremarkable at this time.  Patient is well-appearing and in no apparent distress.  Speaks full sentences without difficulty.  Diagnostic testing performed.  Abdominal exam reassuring.  Given BMX Protonix for symptom management.  Labs showed stable hemoglobin and hematocrit With no leukocytosis or leukopenia.  No leukocytosis.  No UTI.  Lipase normal.  Be having any bleeding.  I did encourage digital rectal exam but the patient declined.  Advised that this is 1 more test to ensure that there is no bleeding but patient stated that he did not want this.  Advised to follow-up with primary care and GI and to return with any new or worsening symptoms.  Patient in agreement with this plan.  Discharged in stable condition.  P.o. challenge passed.        Procedure  Procedures     KASANDRA Patel-CNP  01/26/25 1610

## 2025-01-27 LAB — HOLD SPECIMEN: NORMAL
